# Patient Record
Sex: FEMALE | Race: WHITE | NOT HISPANIC OR LATINO | Employment: OTHER | URBAN - METROPOLITAN AREA
[De-identification: names, ages, dates, MRNs, and addresses within clinical notes are randomized per-mention and may not be internally consistent; named-entity substitution may affect disease eponyms.]

---

## 2013-05-20 LAB — CRC RECOMMENDATION EXT: NORMAL

## 2017-08-30 ENCOUNTER — HOSPITAL ENCOUNTER (OUTPATIENT)
Dept: MEDSURG UNIT | Facility: HOSPITAL | Age: 72
End: 2017-08-31
Attending: INTERNAL MEDICINE | Admitting: INTERNAL MEDICINE

## 2017-08-30 LAB
ABS NEUT (OLG): 4.21 X10(3)/MCL (ref 2.1–9.2)
BASOPHILS # BLD AUTO: 0.1 X10(3)/MCL (ref 0–0.2)
BASOPHILS NFR BLD AUTO: 1 %
BUN SERPL-MCNC: 24 MG/DL (ref 7–18)
CALCIUM SERPL-MCNC: 9.1 MG/DL (ref 8.5–10.1)
CHLORIDE SERPL-SCNC: 109 MMOL/L (ref 98–107)
CO2 SERPL-SCNC: 26 MMOL/L (ref 21–32)
CREAT SERPL-MCNC: 0.9 MG/DL (ref 0.55–1.02)
EOSINOPHIL # BLD AUTO: 0.2 X10(3)/MCL (ref 0–0.9)
EOSINOPHIL NFR BLD AUTO: 3 %
ERYTHROCYTE [DISTWIDTH] IN BLOOD BY AUTOMATED COUNT: 12.6 % (ref 11.5–17)
GLUCOSE SERPL-MCNC: 102 MG/DL (ref 74–106)
HCT VFR BLD AUTO: 37.1 % (ref 37–47)
HGB BLD-MCNC: 12.2 GM/DL (ref 12–16)
INR PPP: 1 (ref 0–1.27)
LYMPHOCYTES # BLD AUTO: 0.8 X10(3)/MCL (ref 0.6–4.6)
LYMPHOCYTES NFR BLD AUTO: 14 %
MCH RBC QN AUTO: 31.8 PG (ref 27–31)
MCHC RBC AUTO-ENTMCNC: 32.9 GM/DL (ref 33–36)
MCV RBC AUTO: 96.6 FL (ref 80–94)
MONOCYTES # BLD AUTO: 0.3 X10(3)/MCL (ref 0.1–1.3)
MONOCYTES NFR BLD AUTO: 5 %
NEUTROPHILS # BLD AUTO: 4.21 X10(3)/MCL (ref 1.4–7.9)
NEUTROPHILS NFR BLD AUTO: 76 %
PLATELET # BLD AUTO: 163 X10(3)/MCL (ref 130–400)
PMV BLD AUTO: 9.4 FL (ref 9.4–12.4)
POTASSIUM SERPL-SCNC: 4.3 MMOL/L (ref 3.5–5.1)
PROTHROMBIN TIME: 13 SECOND(S) (ref 12.1–14.2)
RBC # BLD AUTO: 3.84 X10(6)/MCL (ref 4.2–5.4)
SODIUM SERPL-SCNC: 144 MMOL/L (ref 136–145)
WBC # SPEC AUTO: 5.5 X10(3)/MCL (ref 4.5–11.5)

## 2017-08-31 LAB
ABS NEUT (OLG): 3.09 X10(3)/MCL (ref 2.1–9.2)
ALBUMIN SERPL-MCNC: 3.1 GM/DL (ref 3.4–5)
ALBUMIN/GLOB SERPL: 1.1 RATIO (ref 1.1–2)
ALP SERPL-CCNC: 110 UNIT/L (ref 38–126)
ALT SERPL-CCNC: 18 UNIT/L (ref 12–78)
AST SERPL-CCNC: 26 UNIT/L (ref 15–37)
BASOPHILS # BLD AUTO: 0 X10(3)/MCL (ref 0–0.2)
BASOPHILS NFR BLD AUTO: 1 %
BILIRUB SERPL-MCNC: 0.4 MG/DL (ref 0.2–1)
BILIRUBIN DIRECT+TOT PNL SERPL-MCNC: 0.1 MG/DL (ref 0–0.5)
BILIRUBIN DIRECT+TOT PNL SERPL-MCNC: 0.3 MG/DL (ref 0–0.8)
BUN SERPL-MCNC: 19 MG/DL (ref 7–18)
CALCIUM SERPL-MCNC: 8.7 MG/DL (ref 8.5–10.1)
CHLORIDE SERPL-SCNC: 110 MMOL/L (ref 98–107)
CO2 SERPL-SCNC: 23 MMOL/L (ref 21–32)
CREAT SERPL-MCNC: 0.76 MG/DL (ref 0.55–1.02)
EOSINOPHIL # BLD AUTO: 0.2 X10(3)/MCL (ref 0–0.9)
EOSINOPHIL NFR BLD AUTO: 4 %
ERYTHROCYTE [DISTWIDTH] IN BLOOD BY AUTOMATED COUNT: 12.9 % (ref 11.5–17)
GLOBULIN SER-MCNC: 2.7 GM/DL (ref 2.4–3.5)
GLUCOSE SERPL-MCNC: 91 MG/DL (ref 74–106)
HCT VFR BLD AUTO: 34.9 % (ref 37–47)
HGB BLD-MCNC: 11.1 GM/DL (ref 12–16)
LYMPHOCYTES # BLD AUTO: 1.3 X10(3)/MCL (ref 0.6–4.6)
LYMPHOCYTES NFR BLD AUTO: 26 %
MAGNESIUM SERPL-MCNC: 2.3 MG/DL (ref 1.8–2.4)
MCH RBC QN AUTO: 31.4 PG (ref 27–31)
MCHC RBC AUTO-ENTMCNC: 31.8 GM/DL (ref 33–36)
MCV RBC AUTO: 98.6 FL (ref 80–94)
MONOCYTES # BLD AUTO: 0.4 X10(3)/MCL (ref 0.1–1.3)
MONOCYTES NFR BLD AUTO: 7 %
NEUTROPHILS # BLD AUTO: 3.09 X10(3)/MCL (ref 1.4–7.9)
NEUTROPHILS NFR BLD AUTO: 61 %
PHOSPHATE SERPL-MCNC: 3.8 MG/DL (ref 2.5–4.9)
PLATELET # BLD AUTO: 168 X10(3)/MCL (ref 130–400)
PMV BLD AUTO: 9.7 FL (ref 9.4–12.4)
POTASSIUM SERPL-SCNC: 4.5 MMOL/L (ref 3.5–5.1)
PROT SERPL-MCNC: 5.8 GM/DL (ref 6.4–8.2)
RBC # BLD AUTO: 3.54 X10(6)/MCL (ref 4.2–5.4)
SODIUM SERPL-SCNC: 145 MMOL/L (ref 136–145)
WBC # SPEC AUTO: 5.1 X10(3)/MCL (ref 4.5–11.5)

## 2017-11-13 ENCOUNTER — HOSPITAL ENCOUNTER (OUTPATIENT)
Dept: ADMINISTRATIVE | Facility: HOSPITAL | Age: 72
End: 2017-11-14
Attending: INTERNAL MEDICINE | Admitting: INTERNAL MEDICINE

## 2017-11-13 LAB
ABS NEUT (OLG): 6.8 X10(3)/MCL (ref 2.1–9.2)
ALBUMIN SERPL-MCNC: 3.5 GM/DL (ref 3.4–5)
ALBUMIN/GLOB SERPL: 1.1 RATIO (ref 1.1–2)
ALP SERPL-CCNC: 87 UNIT/L (ref 38–126)
ALT SERPL-CCNC: 20 UNIT/L (ref 12–78)
AST SERPL-CCNC: 27 UNIT/L (ref 15–37)
BASOPHILS # BLD AUTO: 0 X10(3)/MCL (ref 0–0.2)
BASOPHILS NFR BLD AUTO: 0 %
BILIRUB SERPL-MCNC: 0.2 MG/DL (ref 0.2–1)
BILIRUBIN DIRECT+TOT PNL SERPL-MCNC: 0.1 MG/DL (ref 0–0.5)
BILIRUBIN DIRECT+TOT PNL SERPL-MCNC: 0.1 MG/DL (ref 0–0.8)
BUN SERPL-MCNC: 16 MG/DL (ref 7–18)
CALCIUM SERPL-MCNC: 8.3 MG/DL (ref 8.5–10.1)
CHLORIDE SERPL-SCNC: 106 MMOL/L (ref 98–107)
CO2 SERPL-SCNC: 21 MMOL/L (ref 21–32)
CREAT SERPL-MCNC: 0.63 MG/DL (ref 0.55–1.02)
EOSINOPHIL # BLD AUTO: 0 X10(3)/MCL (ref 0–0.9)
EOSINOPHIL NFR BLD AUTO: 0 %
ERYTHROCYTE [DISTWIDTH] IN BLOOD BY AUTOMATED COUNT: 13.2 % (ref 11.5–17)
GLOBULIN SER-MCNC: 3.1 GM/DL (ref 2.4–3.5)
GLUCOSE SERPL-MCNC: 141 MG/DL (ref 74–106)
HCT VFR BLD AUTO: 37.1 % (ref 37–47)
HGB BLD-MCNC: 12.4 GM/DL (ref 12–16)
LYMPHOCYTES # BLD AUTO: 0.9 X10(3)/MCL (ref 0.6–4.6)
LYMPHOCYTES NFR BLD AUTO: 11 %
MCH RBC QN AUTO: 30.5 PG (ref 27–31)
MCHC RBC AUTO-ENTMCNC: 33.4 GM/DL (ref 33–36)
MCV RBC AUTO: 91.4 FL (ref 80–94)
MONOCYTES # BLD AUTO: 0.3 X10(3)/MCL (ref 0.1–1.3)
MONOCYTES NFR BLD AUTO: 4 %
NEUTROPHILS # BLD AUTO: 6.8 X10(3)/MCL (ref 1.4–7.9)
NEUTROPHILS NFR BLD AUTO: 84 %
PLATELET # BLD AUTO: 176 X10(3)/MCL (ref 130–400)
PMV BLD AUTO: 10.1 FL (ref 9.4–12.4)
POTASSIUM SERPL-SCNC: 3.6 MMOL/L (ref 3.5–5.1)
PROT SERPL-MCNC: 6.6 GM/DL (ref 6.4–8.2)
RBC # BLD AUTO: 4.06 X10(6)/MCL (ref 4.2–5.4)
SODIUM SERPL-SCNC: 138 MMOL/L (ref 136–145)
WBC # SPEC AUTO: 8.1 X10(3)/MCL (ref 4.5–11.5)

## 2019-10-14 ENCOUNTER — HOSPITAL ENCOUNTER (OUTPATIENT)
Dept: MEDSURG UNIT | Facility: HOSPITAL | Age: 74
End: 2019-10-15
Attending: INTERNAL MEDICINE | Admitting: INTERNAL MEDICINE

## 2019-10-14 LAB
CK MB SERPL-MCNC: 1.7 NG/ML
CK SERPL-CCNC: 95 UNIT/L (ref 21–232)

## 2019-10-15 LAB
ABS NEUT (OLG): 3.58 X10(3)/MCL (ref 2.1–9.2)
ALBUMIN SERPL-MCNC: 3.5 GM/DL (ref 3.4–5)
ALBUMIN/GLOB SERPL: 1.2 RATIO (ref 1.1–2)
ALP SERPL-CCNC: 72 UNIT/L (ref 46–116)
ALT SERPL-CCNC: 17 UNIT/L (ref 12–78)
AST SERPL-CCNC: 22 UNIT/L (ref 15–37)
BASOPHILS # BLD AUTO: 0 X10(3)/MCL (ref 0–0.2)
BASOPHILS NFR BLD AUTO: 0 %
BILIRUB SERPL-MCNC: 0.5 MG/DL (ref 0.2–1)
BILIRUBIN DIRECT+TOT PNL SERPL-MCNC: 0.09 MG/DL (ref 0–0.2)
BILIRUBIN DIRECT+TOT PNL SERPL-MCNC: 0.41 MG/DL (ref 0–0.8)
BUN SERPL-MCNC: 14.5 MG/DL (ref 7–18)
CALCIUM SERPL-MCNC: 8.5 MG/DL (ref 8.5–10.1)
CHLORIDE SERPL-SCNC: 109 MMOL/L (ref 98–107)
CK MB SERPL-MCNC: 1.2 NG/ML
CO2 SERPL-SCNC: 23.6 MMOL/L (ref 21–32)
CREAT SERPL-MCNC: 0.75 MG/DL (ref 0.6–1.3)
EOSINOPHIL # BLD AUTO: 0.1 X10(3)/MCL (ref 0–0.9)
EOSINOPHIL NFR BLD AUTO: 2 %
ERYTHROCYTE [DISTWIDTH] IN BLOOD BY AUTOMATED COUNT: 12.6 % (ref 11.5–17)
EST. AVERAGE GLUCOSE BLD GHB EST-MCNC: 105 MG/DL
GLOBULIN SER-MCNC: 2.9 GM/DL (ref 2.4–3.5)
GLUCOSE SERPL-MCNC: 93 MG/DL (ref 74–106)
HBA1C MFR BLD: 5.3 % (ref 4.5–6.2)
HCT VFR BLD AUTO: 37.8 % (ref 37–47)
HGB BLD-MCNC: 12.2 GM/DL (ref 12–16)
IMM GRANULOCYTES # BLD AUTO: 0.01 % (ref 0–0.02)
IMM GRANULOCYTES NFR BLD AUTO: 0.2 % (ref 0–0.43)
LYMPHOCYTES # BLD AUTO: 0.9 X10(3)/MCL (ref 0.6–4.6)
LYMPHOCYTES NFR BLD AUTO: 19 %
MCH RBC QN AUTO: 31.7 PG (ref 27–31)
MCHC RBC AUTO-ENTMCNC: 32.3 GM/DL (ref 33–36)
MCV RBC AUTO: 98.2 FL (ref 80–94)
MONOCYTES # BLD AUTO: 0.4 X10(3)/MCL (ref 0.1–1.3)
MONOCYTES NFR BLD AUTO: 8 %
NEUTROPHILS # BLD AUTO: 3.58 X10(3)/MCL (ref 1.4–7.9)
NEUTROPHILS NFR BLD AUTO: 71 %
PLATELET # BLD AUTO: 176 X10(3)/MCL (ref 130–400)
PMV BLD AUTO: 10.5 FL (ref 9.4–12.4)
POC TROPONIN: 0.03 NG/ML (ref 0–0.08)
POC TROPONIN: 0.09 NG/ML (ref 0–0.08)
POTASSIUM SERPL-SCNC: 3.9 MMOL/L (ref 3.5–5.1)
PROT SERPL-MCNC: 6.4 GM/DL (ref 6.4–8.2)
RBC # BLD AUTO: 3.85 X10(6)/MCL (ref 4.2–5.4)
SODIUM SERPL-SCNC: 142 MMOL/L (ref 136–145)
TROPONIN I SERPL-MCNC: <0.02 NG/ML (ref 0.02–0.06)
TSH SERPL-ACNC: 4.68 MIU/ML (ref 0.36–3.74)
WBC # SPEC AUTO: 5.1 X10(3)/MCL (ref 4.5–11.5)

## 2019-10-22 ENCOUNTER — HISTORICAL (OUTPATIENT)
Dept: ADMINISTRATIVE | Facility: HOSPITAL | Age: 74
End: 2019-10-22

## 2019-10-22 LAB
ABS NEUT (OLG): 3.74 X10(3)/MCL (ref 2.1–9.2)
APTT PPP: 30.1 SEC (ref 23.4–34.9)
BUN SERPL-MCNC: 23 MG/DL (ref 7–18)
CALCIUM SERPL-MCNC: 9.3 MG/DL (ref 8.5–10.1)
CHLORIDE SERPL-SCNC: 107 MMOL/L (ref 98–107)
CO2 SERPL-SCNC: 28 MMOL/L (ref 21–32)
CREAT SERPL-MCNC: 0.84 MG/DL (ref 0.55–1.02)
CREAT/UREA NIT SERPL: 27
ERYTHROCYTE [DISTWIDTH] IN BLOOD BY AUTOMATED COUNT: 13 % (ref 11.5–17)
GLUCOSE SERPL-MCNC: 94 MG/DL (ref 74–106)
HCT VFR BLD AUTO: 37.3 % (ref 37–47)
HGB BLD-MCNC: 12.6 GM/DL (ref 12–16)
INR PPP: 1 (ref 2–3)
MCH RBC QN AUTO: 32.5 PG (ref 27–31)
MCHC RBC AUTO-ENTMCNC: 33.8 GM/DL (ref 33–36)
MCV RBC AUTO: 96.1 FL (ref 80–94)
NRBC BLD AUTO-RTO: 0 % (ref 0–0.2)
PLATELET # BLD AUTO: 209 X10(3)/MCL (ref 130–400)
PMV BLD AUTO: 10.5 FL (ref 7.4–10.4)
POTASSIUM SERPL-SCNC: 4.3 MMOL/L (ref 3.5–5.1)
PROTHROMBIN TIME: 12.8 SEC (ref 11.7–14.5)
RBC # BLD AUTO: 3.88 X10(6)/MCL (ref 4.2–5.4)
SODIUM SERPL-SCNC: 138 MMOL/L (ref 136–145)
WBC # SPEC AUTO: 5.3 X10(3)/MCL (ref 4.5–11.5)

## 2019-10-28 ENCOUNTER — HOSPITAL ENCOUNTER (OUTPATIENT)
Dept: ORTHOPEDICS | Facility: HOSPITAL | Age: 74
End: 2019-10-29
Attending: ORTHOPAEDIC SURGERY | Admitting: ORTHOPAEDIC SURGERY

## 2019-10-28 LAB
HCT VFR BLD AUTO: 38.5 % (ref 37–47)
HGB BLD-MCNC: 12.7 GM/DL (ref 12–16)

## 2019-10-29 LAB
ABS NEUT (OLG): 3.76 X10(3)/MCL (ref 2.1–9.2)
BUN SERPL-MCNC: 14 MG/DL (ref 7–18)
CALCIUM SERPL-MCNC: 8.3 MG/DL (ref 8.5–10.1)
CHLORIDE SERPL-SCNC: 109 MMOL/L (ref 98–107)
CO2 SERPL-SCNC: 28 MMOL/L (ref 21–32)
CREAT SERPL-MCNC: 0.83 MG/DL (ref 0.55–1.02)
CREAT/UREA NIT SERPL: 17
ERYTHROCYTE [DISTWIDTH] IN BLOOD BY AUTOMATED COUNT: 13.1 % (ref 11.5–17)
GLUCOSE SERPL-MCNC: 84 MG/DL (ref 74–106)
HCT VFR BLD AUTO: 36.6 % (ref 37–47)
HGB BLD-MCNC: 12.1 GM/DL (ref 12–16)
MCH RBC QN AUTO: 32.2 PG (ref 27–31)
MCHC RBC AUTO-ENTMCNC: 33.1 GM/DL (ref 33–36)
MCV RBC AUTO: 97.3 FL (ref 80–94)
NRBC BLD AUTO-RTO: 0 % (ref 0–0.2)
PLATELET # BLD AUTO: 222 X10(3)/MCL (ref 130–400)
PMV BLD AUTO: 10.1 FL (ref 7.4–10.4)
POTASSIUM SERPL-SCNC: 3.8 MMOL/L (ref 3.5–5.1)
RBC # BLD AUTO: 3.76 X10(6)/MCL (ref 4.2–5.4)
SODIUM SERPL-SCNC: 140 MMOL/L (ref 136–145)
WBC # SPEC AUTO: 5.7 X10(3)/MCL (ref 4.5–11.5)

## 2019-11-12 ENCOUNTER — HISTORICAL (OUTPATIENT)
Dept: ADMINISTRATIVE | Facility: HOSPITAL | Age: 74
End: 2019-11-12

## 2019-12-12 ENCOUNTER — HISTORICAL (OUTPATIENT)
Dept: ADMINISTRATIVE | Facility: HOSPITAL | Age: 74
End: 2019-12-12

## 2020-01-28 ENCOUNTER — HISTORICAL (OUTPATIENT)
Dept: ADMINISTRATIVE | Facility: HOSPITAL | Age: 75
End: 2020-01-28

## 2020-02-05 ENCOUNTER — HISTORICAL (OUTPATIENT)
Dept: RADIOLOGY | Facility: HOSPITAL | Age: 75
End: 2020-02-05

## 2020-02-11 ENCOUNTER — HISTORICAL (OUTPATIENT)
Dept: LAB | Facility: HOSPITAL | Age: 75
End: 2020-02-11

## 2020-02-11 LAB
ABS NEUT (OLG): 2.28 X10(3)/MCL (ref 2.1–9.2)
CRP SERPL HS-MCNC: 0.74 MG/L
ERYTHROCYTE [DISTWIDTH] IN BLOOD BY AUTOMATED COUNT: 12.8 % (ref 11.5–17)
ERYTHROCYTE [SEDIMENTATION RATE] IN BLOOD: 8 MM/HR (ref 0–30)
HCT VFR BLD AUTO: 40.8 % (ref 37–47)
HGB BLD-MCNC: 13.5 GM/DL (ref 12–16)
MCH RBC QN AUTO: 31.2 PG (ref 27–31)
MCHC RBC AUTO-ENTMCNC: 33.1 GM/DL (ref 33–36)
MCV RBC AUTO: 94.2 FL (ref 80–94)
NRBC BLD AUTO-RTO: 0 % (ref 0–0.2)
PLATELET # BLD AUTO: 207 X10(3)/MCL (ref 130–400)
PMV BLD AUTO: 10.1 FL (ref 7.4–10.4)
RBC # BLD AUTO: 4.33 X10(6)/MCL (ref 4.2–5.4)
WBC # SPEC AUTO: 4.8 X10(3)/MCL (ref 4.5–11.5)

## 2020-06-23 ENCOUNTER — HISTORICAL (OUTPATIENT)
Dept: ADMINISTRATIVE | Facility: HOSPITAL | Age: 75
End: 2020-06-23

## 2020-07-07 ENCOUNTER — HISTORICAL (OUTPATIENT)
Dept: ADMINISTRATIVE | Facility: HOSPITAL | Age: 75
End: 2020-07-07

## 2020-08-04 ENCOUNTER — HISTORICAL (OUTPATIENT)
Dept: ADMINISTRATIVE | Facility: HOSPITAL | Age: 75
End: 2020-08-04

## 2020-10-02 ENCOUNTER — HISTORICAL (OUTPATIENT)
Dept: ADMINISTRATIVE | Facility: HOSPITAL | Age: 75
End: 2020-10-02

## 2021-06-22 ENCOUNTER — HISTORICAL (OUTPATIENT)
Dept: ADMINISTRATIVE | Facility: HOSPITAL | Age: 76
End: 2021-06-22

## 2022-04-09 ENCOUNTER — HISTORICAL (OUTPATIENT)
Dept: ADMINISTRATIVE | Facility: HOSPITAL | Age: 77
End: 2022-04-09

## 2022-04-27 VITALS
HEIGHT: 65 IN | DIASTOLIC BLOOD PRESSURE: 82 MMHG | WEIGHT: 130.06 LBS | SYSTOLIC BLOOD PRESSURE: 122 MMHG | BODY MASS INDEX: 21.67 KG/M2

## 2022-04-30 NOTE — CONSULTS
"   Patient:   Tracie Blanc            MRN: [id number]            FIN: [id number]               Age:   72 years     Sex:  Female     :  1945   Associated Diagnoses:   Other anterior dislocation of left hip, initial encounter   Author:   Murphy [place] NP      Consultation Information   Ortho consult      Basic Information   Source of history:  Self, Medical record.    Referral source:  Emergency department.    History limitation:  None.       Chief Complaint   2017 12:18 CDT      slip and fall c/.o left hip pain, positive deformity, pt given fentanyl 200 mcg pta        History of Present Illness   Patient underwent left LIMA 1 month ago per Dr. Miller. She was doing ok, until about 2 weeks ago when she began feeling like "something was loose" in her left hip. She fell in her shower 1 week ago, and then fell again while walking yesterday. She states she felt like her leg was literally slipping out from under her. She was transported to the ED via EMS, where x-rays showed a left hip dislocation. She was placed in knee imobilization and has been on bedrest since admit. She states that she was compliant with posterior hip precautions. Hip was reduced in ED. Denies numbness, tingling distally.       Review of Systems   Constitutional:  Negative.    Eye:  Negative.    Ear/Nose/Mouth/Throat:  Negative.    Respiratory:  Negative.    Cardiovascular:  Negative.    Gastrointestinal:  Negative.    Genitourinary:  Negative.    Hematology/Lymphatics:  Negative.    Endocrine:  Negative.    Immunologic:  Negative.    Musculoskeletal:  Negative except HPI.    Integumentary:  Negative.    Neurologic:  Negative.    Psychiatric:  Negative.    All other systems are negative        Health Status   Allergies:    Allergic Reactions (Selected)  Severity Not Documented  Codeine- No reactions were documented.  Valium- Valium.   Current medications:    Medications (14) Active  Scheduled: (5)  ascorbic acid 500 mg Tab " UD  500 mg 1 tab(s), Oral, Daily  lamotrigine 100 mg Tab  150 mg 1.5 tab(s), Oral, BID  montelukast 10 mg Tab  10 mg 1 tab(s), Oral, qPM  pantoprazole 40 mg EC Tab  40 mg 1 tab(s), Oral, Daily  propranolol 10 mg Tab UD  20 mg 2 tab(s), Oral, BID  Continuous: (1)  sodium chloride 0.9% 1,000 mL  1,000 mL, IV, 75 mL/hr  PRN: (8)  acetaminophen-oxycodone 325 mg-5 mg Tab UD  1 tab(s), Oral, q4hr  albuterol-ipratropium 3mg-0.5 mg/3 mL Sol  3 mL, NEB, q4hr  hydrALAzine 20 mg/ml Inj- 1 mL  10 mg 0.5 mL, IV, q4hr  hydrocodone 5mg/APAP 325 mg  1 tab(s), Oral, q6hr  hydromorphone 2 mg/ml Inj (per mL)  0.5 mg 0.25 mL, IV Push, q4hr  ketorolac 30 mg/mL Sol  15 mg 0.5 mL, IV Push, q6hr  ondansetron 2 mg/mL inj - 2mL  4 mg 2 mL, IV Push, q4hr  promethazine 25 mg/mL Inj  12.5 mg 0.5 mL, IV Slow, q4hr        Histories   Past Medical History:    Active  Wears glasses ([id number])  Left cataract (366.9)  Hypoglycemia ([id number])  Resolved  Restless leg syndrome ([id number]):  Resolved.  seasonal allergies ([id number]):  Resolved.  asthma ([id number]):  Resolved.   Procedure history:    Microlaryngoscopy (None) on 2/17/2014 at 68 Years.  Comments:  2/17/2014 11:08 - Laura Echols RN  auto-populated from documented surgical case  Cataract Extract Phacoemulsification/IOL (Right) on 11/11/2013 at 68 Years.  Comments:  11/11/2013 11:06 - Christiana Durbin RN  auto-populated from documented surgical case  Cataract ([id number]) on 9/30/2013 at 68 Years.  Comments:  11/7/2013 13:36 - Niecy Gaston RN  left  bunionectomy.  Carpal tunnel release.  Comments:  9/19/2013 00:22 - Christiana Ayala RN  2002 and 2010  Back surgery X5.  Umbilical hernia.  right shoulder surgery.  Comments:  9/19/2013 00:19 - Christiana Ayala RN  5 years ago  Spinal fusion ([id number]).  Comments:  9/19/2013 00:21 - Christiana Ayala RN  10/2002, 2004, 2012, and 2013   Social History        Social & Psychosocial Habits    Alcohol  05/16/2013 Risk  Assessment: Denies Alcohol Use    Employment/School  09/19/2013  Highest education: University degree(s)      Comment: wife, mother and grandmother - 09/19/2013 00:24 - Christiana Ayala RN    Home/Environment  09/19/2013  Lives with: Spouse    Nutrition/Health  02/09/2014  Type of diet: Regular    Substance Abuse  05/16/2013 Risk Assessment: Denies Substance Abuse    Tobacco  05/16/2013 Risk Assessment: Denies Tobacco Use      Comment: Denies - 06/23/2016 13:02 - Niurka Bernstein LPN        Physical Examination      Vital Signs (last 24 hrs)_____  Last Charted___________  Temp Oral     36.8 DegC  (AUG 31 03:38)  Heart Rate Peripheral   66 bpm  (AUG 31 03:38)  Resp Rate         18 br/min  (AUG 31 03:38)  SBP      113 mmHg  (AUG 31 03:38)  DBP      69 mmHg  (AUG 31 03:38)  SpO2      97 %  (AUG 31 03:38)     General:  Alert and oriented, No acute distress.    Eye:  Extraocular movements are intact, Normal conjunctiva.    HENT:  Normocephalic, Oral mucosa is moist.    Neck:  Supple, Non-tender.    Respiratory:  Respirations are non-labored, Symmetrical chest wall expansion.    Cardiovascular:  Normal rate, Normal peripheral perfusion.    Gastrointestinal:  Soft, Non-tender, Non-distended.    Genitourinary:  No inguinal tenderness.    Musculoskeletal:  LLE-Nontender to palpation left hip, thigh compartments soft, compressible. GROM hip (log-rolling/circumduction) without significant pain. KI in place. +TA/GS, EHL/FHL intact. SILT distally with 2+ DP pulse..    Integumentary:  Warm, Dry, Pink.    Neurologic:  Alert and oriented.    Cognition and Speech:  Oriented, Speech clear and coherent.       Review / Management   Initial x-ray left hip shows dislocation. Post-reduction films show successful reduction of hip, anatomic alignment, intact hardware.      Impression and Plan   Diagnosis     Other anterior dislocation of left hip, initial encounter ([id number] S73.035A).     Course:  Progressing as expected.    Orders      FWBAT LLE with PT. KI when OOB for better stability. Continue PHP. Fall precautions at home. OK to discharge from ortho standpoint. F/u Dr. Miller within the next week..

## 2022-04-30 NOTE — H&P
Patient:   Tracie Blanc            MRN: [id number]            FIN: [id number]               Age:   72 years     Sex:  Female     :  1945   Associated Diagnoses:   None   Author:   Blank Falcon MD      Basic Information   History limitation:  None.       Chief Complaint   Left hip pain      History of Present Illness             The patient presents with This is a 72-year-old  female comes with a ER with complaints of left hip pain after tripping and falling today.  This is her 2nd fall in the past week.  She fell about a weeks ago while in the shower when her leg give out.  But then she got up and was able to walk around so she did not get this checked at that time  Patient had an elective left hip replacement on  with Dr. yoder and 2 days after her discharge she bumped her commode and is for a stitch.  This was evaluated by Dr. yoder and no intervention was deemed necessary. After her fall today she came to the ER and was diagnosed with left hip dislocation.  Patient underwent left hip manipulation/reduction in ER.  She was given ketamine, propofol in ER.  Prior to her arrival at [facility] she was given fentanyl..  Prior to fall patient denies any chest pain, shortness of breath, headache, nausea vomiting, fever chills, stools black stools, dysuria hematuria or seizures.  She is awake alert and oriented ×3 and in no acute distress.  No focal neuro deficits and follows all commands well.  Strength 5/5 in all extremities except for left lower extremity which is in brace.  She does move her left foot while  Patient admitted to hospitalist service.  Orthopedic consulted.  Patient is a full code.        Review of Systems   Constitutional:  Negative except as documented in history of present illness.    Eye:  Negative except as documented in history of present illness.    Ear/Nose/Mouth/Throat:  Negative except as documented in history of present illness.    Respiratory:   Negative except as documented in history of present illness.    Cardiovascular:  Negative except as documented in history of present illness.    Gastrointestinal:  Negative except as documented in history of present illness.    Genitourinary:  Negative except as documented in history of present illness.    Hematology/Lymphatics:  Negative except as documented in history of present illness.    Endocrine:  Negative except as documented in history of present illness.    Immunologic:  Negative except as documented in history of present illness.    Musculoskeletal:  Negative except as documented in history of present illness.    Integumentary:  Negative except as documented in history of present illness.    Neurologic:  Negative except as documented in history of present illness.    Psychiatric:  Negative except as documented in history of present illness.    All other systems are negative      Health Status   Allergies:    Allergic Reactions (All)  Severity Not Documented  Codeine- No reactions were documented.  Valium- Valium.  Canceled/Inactive Reactions (All)  Severity Not Documented  Morphine- No reactions were documented.,    Allergies (2) Active Reaction  codeine None Documented  Valium Valium     Current medications:  (Selected)   Inpatient Medications  Ordered  Dextrose 5%-1/4 Normal Saline 500 mL: 500 mL, 500 mL, IV, 125 mL/hr, start date 08/30/17 17:53:00 CDT  Pending Complete  midazolam: 2 mg, form: Injection, IV Push, q5min, Order duration: 2 dose(s), first dose 02/17/14 9:16:00 CST, stop date 02/17/14 9:25:00 CST, (up to 5 mg for moderate anxiety)  midazolam: 2 mg, form: Injection, IV Push, q5min, Order duration: 2 dose(s), first dose 05/20/13 7:20:00 CDT, stop date 05/20/13 7:29:00 CDT, (up to 5 mg for moderate anxiety)  Prescriptions  Prescribed  LaMICtal 150 mg oral tablet: 150 mg = 1 tab(s), Oral, BID, # 180 tab(s), 4 Refill(s), Pharmacy: [facility]  Percocet 5/325 oral tablet: 1  tab(s), Oral, q4hr, PRN PRN for pain, X 5 day(s), # 30 tab(s), 0 Refill(s)  Documented Medications  Documented  Calcium, Magnesium and Zinc oral tablet: 1 tab(s), Oral, Daily, # 30 tab(s), 0 Refill(s)  ClonAZEpam 2 mg oral tablet: 2 mg = 1 tab(s), Oral, At Bedtime, 0 Refill(s)  EPI-pen 1 mg/mL injectable solution: PRN allergy symptoms, 0 Refill(s)  MVI with Minerals (Adult Tab): 1 tab(s), Oral, Daily, # 30 tab(s), 0 Refill(s)  Singulair 10 mg oral TABLET: 10 mg = 1 tab(s), Oral, qPM, # 90 tab(s), 0 Refill(s)  Vitamin C 500 mg oral tablet: 500 mg = 1 tab(s), Oral, Daily, # 30 tab(s), 0 Refill(s)  albuterol-ipratropium MDI inhalation aerosol with adapter: INH, As Directed, PRN wheezing, 0 Refill(s)  cyclobenzaprine 10 mg oral tablet: 10 mg = 1 tab(s), Oral, BID, PRN PRN for spasm, # 30 tab(s), 0 Refill(s)  propranolol 20 mg oral tablet: 20 mg = 1 tab(s), Oral, BID, # 180 tab(s), 0 Refill(s),    Medications (1) Active  Scheduled: (0)  Continuous: (1)  D5W1/4NS 500 mL  500 mL, IV, 125 mL/hr  PRN: (0)        Histories   Past Medical History: Asthma, cataracts, seizures (last seizure was ), restless leg syndrome   Family History: Both parents  in their 80s.  Mother had Alzheimer's.  Unknown father's medical history   Procedure history: Micro-laryngoscopy, cataract surgery, left bunionectomy, carpal tunnel release, back surgery ×5, umbilical hernia repair, right shoulder surgery, spinal fusion   Social History     Denies tobacco abuse, alcohol abuse or illicit drug abuse.        Physical Examination      Vital Signs (last 24 hrs)_____  Last Charted___________  Heart Rate Peripheral   73 bpm  (AUG 30 16:27)  Resp Rate         L 10br/min  (AUG 30 15:32)  SBP      H 157mmHg  (AUG 30 16:)  DBP      H 91mmHg  (AUG 30 16:)  SpO2      100 %  (AUG 30 16:)     General:  Alert and oriented, No acute distress.    Eye:  Pupils are equal, round and reactive to light, Extraocular movements are intact, Normal  conjunctiva, Vision unchanged.    HENT:  Normocephalic, Normal hearing, Oral mucosa is moist, No pharyngeal erythema.    Neck:  Supple, Non-tender, No carotid bruit, No jugular venous distention, No lymphadenopathy.    Respiratory:  Lungs are clear to auscultation, Respirations are non-labored, Breath sounds are equal, Symmetrical chest wall expansion, No chest wall tenderness.    Cardiovascular:  Normal rate, Regular rhythm, No murmur, No gallop, Good pulses equal in all extremities, Normal peripheral perfusion.    Gastrointestinal:  Soft, Non-tender, Non-distended, Normal bowel sounds.    Genitourinary:  No costovertebral angle tenderness, No inguinal tenderness.    Lymphatics:  No lymphadenopathy neck, axilla, groin.    Musculoskeletal:  No swelling, No deformity, Left hip mild tenderness.    Integumentary:  Warm, Dry, No rash, Left hip surgical scar healed well with no discharge or bleeding.    Neurologic:  Alert, Oriented, Normal sensory, Normal motor function, No focal deficits, Cranial Nerves II-XII are grossly intact.    Cognition and Speech:  Oriented, Speech clear and coherent, Functional cognition intact.    Psychiatric:  Cooperative, Appropriate mood & affect, Normal judgment, Non-suicidal.       Review / Management   Results review:     Labs (Last four charted values)  Glucose              102 (AUG 30)   PT                   13.0 (AUG 30)   INR                  1.00 (AUG 30) .    Radiology results   Rad Results (ST)   Accession: [id number]  Order: XR Hip Left 1 View  Report Dt/Tm: 08/30/2017 14:56  Report:      XR Hip Left 1 View     REASON FOR EXAM: Post Reduction     COMPARISON:None     FINDINGS:     There is left hip arthroplasty. There is no fracture or dislocation.      Accession: [id number]  Order: XR Hip Left 2 Views  Report Dt/Tm: 08/30/2017 13:57  Report:   Indication: Acute injury     FINDINGS: Frontal and crosstable lateral views of the left hip were  performed. The femoral component of a  left hip arthroplasty is  dislocated superiorly from the hip joint. No fracture deformities are  identified.     IMPRESSION: Superior left hip dislocation.         Documentation reviewed   Condition:  Fair.       Impression and Plan   Diagnosis       Superior left hip dislocation status post fall  -Status post manipulation/reduction in ER on 8/30/2017  -Orthopedic consulted  -PT OT    Left hip pain due to above  -Cover with when necessary pain medications    History of seizures  -Last seizure was in 1998  -Resume home lamotrigine  -Seizure precautions  -Neurochecks  -Stable    Essential hypertension  -Resume home propranolol 20 mg by mouth twice a day  -Cover with hydralazine 10 mg IV every 4 hours when necessary for any systolic blood pressure greater than 160    Chronic back pain  -Cover when necessary pain medications    SCDs  PPI  CODE STATUS = full code    Discharge home with home health whenever cleared by orthopedic.

## 2022-04-30 NOTE — DISCHARGE SUMMARY
Patient:   Tracie Blanc            MRN: [id number]            FIN: [id number]               Age:   72 years     Sex:  Female     :  1945   Associated Diagnoses:   None   Author:   Blank Falcon MD      Physical Examination   General:  Alert and oriented, No acute distress.    Eye:  Pupils are equal, round and reactive to light, Extraocular movements are intact, Normal conjunctiva.    HENT:  Normocephalic, Normal hearing, Oral mucosa is moist.    Neck:  Supple, No carotid bruit, No jugular venous distention, No thyromegaly.    Respiratory:  Lungs are clear to auscultation, Respirations are non-labored, Breath sounds are equal, No chest wall tenderness.    Cardiovascular:  Normal rate, Regular rhythm, No murmur, No gallop, No edema.    Gastrointestinal:  Soft, Non-tender, Non-distended, Normal bowel sounds, No organomegaly.       Vital Signs (last 24 hrs)_____  Last Charted___________  Temp Oral     36.8 DegC  (AUG 31 03:)  Heart Rate Peripheral   66 bpm  (AUG 31 03:)  Resp Rate         18 br/min  (AUG 31 03:)  SBP      113 mmHg  (AUG 31 03:38)  DBP      69 mmHg  (AUG 31 03:)  SpO2      97 %  (AUG 31 03:38)     Lymphatics:  No lymphadenopathy neck, axilla, groin.    Musculoskeletal:  No tenderness, No swelling.    Integumentary:  Warm, Dry, Intact, No pallor, No rash.    Neurologic:  Alert, Oriented, Normal sensory, Normal motor function, No focal deficits, Decreased range of motion left knee due to immobilizer otherwise all other extremities strength equal 5/5.    Psychiatric:  Cooperative, Appropriate mood & affect, Normal judgment, Non-suicidal.    Genitourinary:  No costovertebral angle tenderness, No inguinal tenderness.       Hospital Course   Hospital Course   Admitted from:    This is a 72-year-old  female comes with a ER with complaints of left hip pain after tripping and falling on day of admit.  This was her 2nd fall in the past week.  She fell about a week ago  while in the shower when her leg give out.  But then she got up and was able to walk around so she did not get this checked at that time  Patient had an elective left hip replacement on July 25 of 2017 with Dr. yoder and 2 days after her discharge she bumped her commode and tore a stitch.  This was evaluated by Dr. yoder and no intervention was deemed necessary. After her fall on day of admit she came to the ER and was diagnosed with left hip dislocation.  Patient underwent left hip manipulation/reduction in ER.  She was given ketamine, propofol in ER.  Prior to her arrival at [facility] she was given fentanyl..  Prior to fall patient denies any chest pain, shortness of breath, headache, nausea vomiting, fever chills, stools black stools, dysuria hematuria or seizures.  She is awake alert and oriented ×3 and in no acute distress.  No focal neuro deficits and follows all commands well.  Strength 5/5 in all extremities except for left lower extremity which is in brace.  Recommendations were made to keep the patient overnight for observation due to heavy sedatives given. Patient admitted to hospitalist service.  Orthopedic consulted and they have cleared the patient for discharge.  Patient is now clinically improved and hemodynamically stable for discharge to home and outpatient follow-up    Discharge diagnosis:  Superior left hip dislocation status post fall  Left hip pain due to above  History of seizures  Essential hypertension  Chronic back pain    Discharge home with home health if okay by orthopedic  Diet = cardiac  Activity = per orthopedic and PT OT  Follow-up with PCP and orthopedic ×1 week  Total discharge time = 31 minutes.        Discharge Plan   Discharge Summary Plan   Discharge Status: stable.

## 2022-04-30 NOTE — DISCHARGE SUMMARY
Patient:   Tracie Blanc            MRN: [id number]            FIN: [id number]               Age:   72 years     Sex:  Female     :  1945   Associated Diagnoses:   None   Author:   Maddy Mercer MD      Discharge Information      Discharge Summary Information   ADMIT/DISCHARGE DATE   Admit Date: 2017  Discharge Date: 2017     Physicians   Attending Physician - Santy FOLEY, Sourav WILL  Attending Physician - Maddy Mercer MD  Admitting Physician - Santy FOLEY, Sourav WILL  Consulting Physician - Santy FOLEY, Sourav WILL  Primary Care Physician - Sulema Boss MD     Discharge Diagnosis   Admission diagnosis   left hip dislocation  Intractable nausea and vomiting  Seizure disorder    Discharge diagnoses  Recurrent left hip dislocation  Intractable nausea vomiting resolved  Seizure disorder     Discharge Medications   Prescribed  ondansetron (Zofran 4 mg oral tablet) 4 mg, Oral, q8hr, PRN as needed for nausea/vomiting  Continue  EPINEPHrine (EPI-pen 1 mg/mL injectable solution), PRN allergy symptoms  acetaminophen-HYDROcodone (Norco 7.5 mg-325 mg oral tablet) 1 tab(s), Oral, q4hr, PRN for pain  lamoTRIgine (LaMICtal 150 mg oral tablet) 150 mg, Oral, BID  montelukast (Singulair 10 mg oral TABLET) 10 mg, Oral, qPM  multivitamin with minerals (MVI with Minerals (Adult Tab)) 1 tab(s), Oral, Daily  traZODone (traZODONE 50 mg oral tablet ( Desyrel )) 50 mg, Oral, Once a day (at bedtime)  Discontinue  albuterol-ipratropium (albuterol-ipratropium MDI inhalation aerosol with adapter), INH, As Directed, PRN wheezing  multivitamin with minerals (Calcium, Magnesium and Zinc oral tablet) 1 tab(s), Oral, Daily        Followup   Gutierrez Miller III, MD, within 2 to 4 days   Call for followup appointment        Hospital Course   Hospital Course   Time Spent on Discharge: 40 minutes.       Ms. Blanc is a 72 year old female with a medical history of Seizure Disorder, Hypoglycemia, RLS, and  SeasonalAllergies. She presented to [facility] ER with complaints of left hip pain; leg shortened and internally rotated in triage. She believes she may have dislocated it. She bent over in a swivel chair, the chair turned her hips while sitting, and she felt her hip dislocate. She underwent hip surgery in July with Dr. Miller. She presented to ER in August with hip pain; it was determined that she had discolated her hip at that time. Hip was put back into place by ER physician with the use of IV propofol. Following the procedure, she became very anxious and shakey with several episodes of nausea and vomitting unrelieved with Zofran x 2. She denies other complaints. Denies CP, SOB, palpitations, dizziness, weakness, syncope, near syncope, or falls. Lives at home with ; does not use an assistive device for ambulation.   Initial ER VS: /90, HR 81, Resp 18, oral temp 36.8, and Sp02 99% on RA. CBC, CMP unremarkable. XRs with dislocated hip. She was given propofol and hip was re-aligned by ER MD. She was also given Dilaudid x 2, Zofran x 2, Ativan 2mg, ketoraolac, and IVF while in the ER. SHe was admitted to the hospitalist services for further evaluation and management of intractable nausea/vomitting following propofol administration.      The patient's nausea vomiting improved.  She was tolerating diet before discharge.  She was ambulating with help.  She was continued on home medications.  The patient will follow up with orthopedics this week.         Physical Examination      Vital Signs (last 24 hrs)_____  Last Charted___________  Temp Oral     36.7 DegC  (NOV 14 07:09)  Heart Rate Peripheral   69 bpm  (NOV 14 07:09)  Resp Rate         18 br/min  (NOV 13 23:16)  SBP      H 150mmHg  (NOV 14 07:09)  DBP      80 mmHg  (NOV 14 07:09)  SpO2      98 %  (NOV 14 07:09)     General:  Alert and oriented, No acute distress.    Cognition and Speech:  Oriented, Speech clear and coherent.    HENT:  Normocephalic, Normal  hearing, Oral mucosa is moist.    Neck:  Supple.    Respiratory:  Lungs are clear to auscultation, Respirations are non-labored, Breath sounds are equal.    Cardiovascular:  Normal rate, Regular rhythm, No edema.    Gastrointestinal:  Soft, Non-tender, Non-distended, Normal bowel sounds.    Integumentary:  Warm, Dry, Intact.    Musculoskeletal:  Normal strength, No tenderness, No swelling.    Neurologic:  Alert, Oriented, Normal sensory, No focal deficits.    Psychiatric:  Cooperative           Discharge Plan   Discharge Summary Plan   Discharge disposition: discharge to home into the care of family member.     Prescriptions: reviewed with patient.     Course   Improving.     Education and Follow-up   Counseled: patient.

## 2022-04-30 NOTE — ED PROVIDER NOTES
Patient:   Tracie Blanc            MRN: [id number]            FIN: [id number]               Age:   72 years     Sex:  Female     :  1945   Associated Diagnoses:   Closed dislocation of left hip   Author:   Miguel MOROCHO MD, Wild WILL      Basic Information   Time seen: Date & time 2017 19:48:00.   History source: Patient, spouse.   Arrival mode: Ambulance.   History limitation: None.   Additional information: Patient's physician(s): Paul MOROCHO MD, Gutierrez PAIZ, Chief Complaint from Nursing Triage Note : Chief Complaint   2017 19:48 CST     Chief Complaint           pt c/o left hip pain. pt states she had surgery in july, fell in august and had dislocation. states today twisted while sitting in chair and felt dislocation again. obvious deformity. left leg is shortened and internally rotated. cms intact. pt received  .      History of Present Illness   The patient presents with 71 y/o C female with history of left hip dislocation with hip replacement in 2017 presents to ED via EMS c/o left hip pain onset PTA. Patient states she bent over in a swivel chair, the chair twisted and she felt her hip dislocate..  The onset was just prior to arrival.  The course/duration of symptoms is constant.  Type of injury: twisting.  Location: Left hip. The character of symptoms is pain.  The degree at onset was moderate.  The degree at present is moderate.  The exacerbating factor is none.  The relieving factor is none.  The location where the incident occurred was at home.  Risk factors consist of Hx of left hip dislocation.  Therapy today: emergency medical services.  Associated symptoms: none.        Review of Systems   Constitutional symptoms:  No fever, no chills, no sweats, no weakness.    Skin symptoms:  No rash,    Eye symptoms:  No recent vision problems,    ENMT symptoms:  No ear pain,    Respiratory symptoms:  No shortness of breath, no orthopnea.    Cardiovascular symptoms:  No chest pain, no  palpitations.    Gastrointestinal symptoms:  No abdominal pain, no nausea, no vomiting, no diarrhea.    Genitourinary symptoms:  No dysuria, no hematuria.    Musculoskeletal symptoms:  left hip pain, no back pain, no Muscle pain.    Psychiatric symptoms:  No anxiety, no depression.    Allergy/immunologic symptoms:  No seasonal allergies, no food allergies.              Additional review of systems information: All other systems reviewed and otherwise negative.      Health Status   Allergies:    Allergic Reactions (Selected)  Severity Not Documented  Codeine- No reactions were documented.  Valium- Valium..   Medications:  (Selected)   Inpatient Medications  Pending Complete  midazolam: 2 mg, form: Injection, IV Push, q5min, Order duration: 2 dose(s), first dose 02/17/14 9:16:00 CST, stop date 02/17/14 9:25:00 CST, (up to 5 mg for moderate anxiety)  midazolam: 2 mg, form: Injection, IV Push, q5min, Order duration: 2 dose(s), first dose 05/20/13 7:20:00 CDT, stop date 05/20/13 7:29:00 CDT, (up to 5 mg for moderate anxiety)  Prescriptions  Prescribed  LaMICtal 150 mg oral tablet: 150 mg = 1 tab(s), Oral, BID, # 180 tab(s), 4 Refill(s), Pharmacy: [facility]  Documented Medications  Documented  Calcium, Magnesium and Zinc oral tablet: 1 tab(s), Oral, Daily, # 30 tab(s), 0 Refill(s)  ClonAZEpam 2 mg oral tablet: 2 mg = 1 tab(s), Oral, At Bedtime, 0 Refill(s)  EPI-pen 1 mg/mL injectable solution: PRN allergy symptoms, 0 Refill(s)  MVI with Minerals (Adult Tab): 1 tab(s), Oral, Daily, # 30 tab(s), 0 Refill(s)  Singulair 10 mg oral TABLET: 10 mg = 1 tab(s), Oral, qPM, # 90 tab(s), 0 Refill(s)  Vitamin C 500 mg oral tablet: 500 mg = 1 tab(s), Oral, Daily, # 30 tab(s), 0 Refill(s)  albuterol-ipratropium MDI inhalation aerosol with adapter: INH, As Directed, PRN wheezing, 0 Refill(s)  cyclobenzaprine 10 mg oral tablet: 10 mg = 1 tab(s), Oral, BID, PRN PRN for spasm, # 30 tab(s), 0 Refill(s)  propranolol 20  mg oral tablet: 20 mg = 1 tab(s), Oral, BID, # 180 tab(s), 0 Refill(s).      Past Medical/ Family/ Social History   Medical history:    Active  Wears glasses ([id number])  Left cataract (366.9)  Hypoglycemia (550261741)  Resolved  Restless leg syndrome ([id number]):  Resolved.  seasonal allergies ([id number]):  Resolved.  asthma ([id number]):  Resolved., Reviewed as documented in chart.   Surgical history:    Microlaryngoscopy (None) on 2/17/2014 at 68 Years.  Comments:  2/17/2014 11:08 - Laura Echols RN  auto-populated from documented surgical case  Cataract Extract Phacoemulsification/IOL (Right) on 11/11/2013 at 68 Years.  Comments:  11/11/2013 11:06 - Christiana Durbin RN  auto-populated from documented surgical case  Cataract ([id number]) on 9/30/2013 at 68 Years.  Comments:  11/7/2013 13:36 - Niecy Gaston RN  left  bunionectomy.  Carpal tunnel release.  Comments:  9/19/2013 00:22 - Christiana Ayala RN  2002 and 2010  Back surgery X5.  Umbilical hernia.  right shoulder surgery.  Comments:  9/19/2013 00:19 - Christiana Ayala RN  5 years ago  Spinal fusion ([id number]).  Comments:  9/19/2013 00:21 - Christiana Ayala RN  10/2002, 2004, 2012, and 2013, Reviewed as documented in chart.   Family history: Not significant.   Social history: Alcohol use: Denies, Tobacco use: Denies, Drug use: Denies.   Problem list:    Active Problems (10)  Epilepsy   Grand mal seizure   Hypoglycemia   Ilioinguinal neuralgia   Impaired mobility   Laryngitis   Left cataract   Tremor   Vocal cord cyst   Wears glasses   .      Physical Examination               Vital Signs   Vital Signs   11/13/2017 19:48 CST     Temperature Oral          36.8 DegC                             Peripheral Pulse Rate     81 bpm                             Respiratory Rate          18 br/min                             SpO2                      99 %                             Oxygen Therapy            Room air                              Systolic Blood Pressure   165 mmHg  HI                             Diastolic Blood Pressure  90 mmHg  .   Measurements   11/13/2017 19:48 CST     Weight Dosing             70.5 kg                             Weight Measured and Calculated in Lbs     155.42 lb                             Weight Estimated          70.5 kg                             Height/Length Dosing      165.1 cm                             Height/Length Estimated   165.1 cm                             Body Mass Index Estimated 25.86 kg/m2  .   Basic Oxygen Information   11/13/2017 19:48 CST     SpO2                      99 %                             Oxygen Therapy            Room air  .   General:  Alert, no acute distress.    Skin:  Warm, pink, intact, moist, no rash.    Head:  Normocephalic, atraumatic.    Cardiovascular:  Regular rate and rhythm, No murmur, Normal peripheral perfusion, No edema.    Respiratory:  Lungs are clear to auscultation, respirations are non-labored, breath sounds are equal, Symmetrical chest wall expansion.    Gastrointestinal:  Soft, Nontender, Non distended, Normal bowel sounds.    Musculoskeletal:  Normal strength, Leg position: Left leg, shortened, rotated internally.    Neurological:  Alert and oriented to person, place, time, and situation, No focal neurological deficit observed, CN II-XII intact, normal sensory observed, normal motor observed, normal speech observed.    Lymphatics:  No lymphadenopathy.   Psychiatric:  Cooperative, appropriate mood & affect, normal judgment.       Medical Decision Making   Documents reviewed:  Emergency department nurses' notes, emergency department records.    Orders  Xray    XR Hip Left 2 Views, Miguel MOROCHO MD, Wild WILL, 11/13/17, 19:53, Ordered.   Results review:   Hip x-ray findings  Interpretation by Emergency Physician, Prosthetic hip with posterior dislocation.       Reexamination/ Reevaluation   Time: 11/13/2017 21:36:00 .   Assessment: Hip dislocated  neurovascularly intact patient given Dilaudid patient states she had a significant reaction to analgesics in her prior reduction per her chart she was given ketamine and propofol and what she described was anxiety and an emergency reaction propofol only was given patient with good reduction normal saturation neurologically intact at discharge did inform the physician's assistant who is on call for Dr. yoder.      Procedure   Fracture/ Dislocation Procedure   Confirmed: Patient, procedure, side, and site correct.    Consent: Patient.    Indication: Dislocation.    Location: Left, Hip.    Pre procedure exam: Circulation, motor, and sensory intact.    Procedural sedation: None.    Monitoring: Cardiac, blood pressure, continuous pulse oximetry.    Preparation   Technique: Traction-countertraction method.    Post-procedure exam: Circulation, motor, and sensory intact, X-ray: reduced, Recovered from sedation.    Patient tolerated: Well.    Complications: None.    Performed by: Self.    Procedural sedation   Time: 11/13/2017 21:51:00 .    Confirmed: Patient and procedure correct.    Consent: Patient.    Indication: Closed reduction.    Monitoring: Cardiac, blood pressure, continuous pulse oximetry.    Preparation: IV access.    ASA Class: 2- mild systemic disease.    Physical exam: See physical exam documentation.    Pre sedation vital signs: See nurse's notes.    Procedural sedation: Propofol , IV.    Post sedation vital signs: See nurse's notes.    Procedure Time: See hospital procedure form.    Post sedation condition: Improved.    Patient tolerated: Well.    Complications: None.    Performed by: Self.       Impression and Plan   Diagnosis   Closed dislocation of left hip ([id number] S73.005A)   Plan   Condition: Improved, Stable.    Disposition: Medically cleared, Discharged: Time  11/13/2017 21:53:00, to home.    Prescriptions: Launch prescriptions   Pharmacy:  [facility] 7.5 mg-325 mg oral tablet (Prescribe): 1 tab(s),  Oral, q4hr, PRN PRN for pain, X 5 day(s), # 24 tab(s), 0 Refill(s)  .    Patient was given the following educational materials: Hip Dislocation, Hip Dislocation.    Follow up with: ; Gutierrez Miller III, MD Within 2 to 4 days Call for followup appointment, Primary Care Physician.    Counseled: Patient, Family, Regarding diagnosis, Regarding diagnostic results, Regarding treatment plan, Regarding prescription, Patient indicated understanding of instructions, Family understood.    Notes: I, Erum Easton, acted solely as a scribe for and in the presence of Dr. Rivera who performed the service.  ,       This scribes note accurately reflects the work done by me I have reviewed the note and personally performed a history and physical and agree with all the documentation and findings.       Addendum   Initially patient was without symptoms feeling good enough to go home but patient and began to feel anxious and nauseous she states is like last time she had too much anesthesia patient had a good 15 minute period where she was without symptoms after she had woken up from the propofol unable to control symptoms with 2 doses of Zofran will give Ativan and admit for observation

## 2022-04-30 NOTE — H&P
Patient:   Tracie Blanc            MRN: [id number]            FIN: [id number]               Age:   72 years     Sex:  Female     :  1945   Associated Diagnoses:   None   Author:   Sourav Madera MD      Basic Information   Time Seen:  Date & Time 2017 02:57:00.    Source of history:  Self.    History limitation:  None.    Advance directive:  Full code.    Provider information/ cc:  Primary care:  Karyn FOLEY , Sulema WESLEY       Chief Complaint   Left hip pain; believes she dislocated it.      History of Present Illness   Ms. Blanc is a 72 year old female with a medical history of Seizure Disorder, Hypoglycemia, RLS, and SeasonalAllergies. She presented to [facility] ER with complaints of left hip pain; leg shortened and internally rotated in triage. She believes she may have dislocated it. She bent over in a swivel chair, the chair turned her hips while sitting, and she felt her hip dislocate. She underwent hip surgery in July with Dr. Miller. She presented to ER in August with hip pain; it was determined that she had discolated her hip at that time. Hip was put back into place by ER physician with the use of IV propofol. Following the procedure, she became very anxious and shakey with several episodes of nausea and vomitting unrelieved with Zofran x 2. She denies other complaints. Denies CP, SOB, palpitations, dizziness, weakness, syncope, near syncope, or falls. Lives at home with ; does not use an assistive device for ambulation.   Initial ER VS: /90, HR 81, Resp 18, oral temp 36.8, and Sp02 99% on RA. CBC, CMP unremarkable. XRs with dislocated hip. She was given propofol and hip was re-aligned by ER MD. She was also given Dilaudid x 2, Zofran x 2, Ativan 2mg, ketoraolac, and IVF while in the ER. He was admitted to the hospitalist services for further evaluation and management of intractable nausea/vomitting following propofol administration.       Review of Systems    Except as documented, all other systems reviewed and negative.       Health Status   Allergies:    Allergic Reactions (Selected)  Severity Not Documented  Codeine- No reactions were documented.  Ketamine- Hallucinations.  Valium- Valium.,    Allergies (3) Active Reaction  codeine None Documented  ketamine HALLUCINATIONS  Valium Valium     Current medications:  (Selected)   Inpatient Medications  Ordered  Phenergan: 12.5 mg, form: Injection, IV Push, q4hr PRN for nausea/vomiting, first dose 11/14/17 0:54:00 CST, 26,051  Protonix: 40 mg, form: Tab-EC, Oral, Daily, first dose 11/14/17 6:00:00 CST, 66,022  Sodium Chloride 0.9% 1,000 mL: 1,000 mL, 1,000 mL, IV, 125 mL/hr, start date 11/14/17 0:54:00 CST  Zofran: 8 mg, form: Injection, IV Slow, q4hr PRN for nausea, first dose 11/14/17 0:54:00 CST, 26,051  acetaminophen: 1,000 mg, form: Tab, Oral, q6hr PRN for pain, mild, first dose 11/14/17 0:54:00 CST, 30,022  Pending Complete  midazolam: 2 mg, form: Injection, IV Push, q5min, Order duration: 2 dose(s), first dose 02/17/14 9:16:00 CST, stop date 02/17/14 9:25:00 CST, (up to 5 mg for moderate anxiety)  midazolam: 2 mg, form: Injection, IV Push, q5min, Order duration: 2 dose(s), first dose 05/20/13 7:20:00 CDT, stop date 05/20/13 7:29:00 CDT, (up to 5 mg for moderate anxiety)  Prescriptions  Prescribed  LaMICtal 150 mg oral tablet: 150 mg = 1 tab(s), Oral, BID, # 180 tab(s), 4 Refill(s), Pharmacy: [facility]  [place] 7.5 mg-325 mg oral tablet: 1 tab(s), Oral, q4hr, PRN PRN for pain, X 5 day(s), # 24 tab(s), 0 Refill(s)  Documented Medications  Documented  Calcium, Magnesium and Zinc oral tablet: 1 tab(s), Oral, Daily, # 30 tab(s), 0 Refill(s)  EPI-pen 1 mg/mL injectable solution: PRN allergy symptoms, 0 Refill(s)  MVI with Minerals (Adult Tab): 1 tab(s), Oral, Daily, # 30 tab(s), 0 Refill(s)  Singulair 10 mg oral TABLET: 10 mg = 1 tab(s), Oral, qPM, # 90 tab(s), 0 Refill(s)  albuterol-ipratropium MDI  inhalation aerosol with adapter: INH, As Directed, PRN wheezing, 0 Refill(s)  traZODONE 50 mg oral tablet ( Desyrel ): 50 mg = 1 tab(s), Oral, Once a day (at bedtime), states she takes 1/4 of a pill as needed,    Medications (5) Active  Scheduled: (1)  pantoprazole 40 mg EC Tab  40 mg 1 tab(s), Oral, Daily  Continuous: (1)  sodium chloride 0.9% 1,000 mL  1,000 mL, IV, 125 mL/hr  PRN: (3)  acetaminophen 500 mg Tab  1,000 mg 2 tab(s), Oral, q6hr  ondansetron 2 mg/mL inj - 2mL  8 mg 4 mL, IV Slow, q4hr  promethazine 25 mg/mL Inj  12.5 mg 0.5 mL, IV Push, q4hr        Histories     Past Medical History: Seizure Disorder, Hypoglycemia, RLS, SeasonalAllergies   Past Surgical History: Bunionectomy, Carpal Tunnel Release, Back Surgery x 5, Rigjt shoulder surgery,   Family History: Denies   Social History:  No alcohol, tobacco or illicit drug use.       Physical Examination      Vital Signs (last 24 hrs)_____  Last Charted___________  Temp Oral     36.5 DegC  (NOV 14 00:43)  Heart Rate Peripheral   83 bpm  (NOV 14 00:43)  Resp Rate         18 br/min  (NOV 13 23:16)  SBP      H 152mmHg  (NOV 14 00:43)  DBP      H 96mmHg  (NOV 14 00:43)  SpO2      98 %  (NOV 14 00:43)     General:  Alert and oriented, No acute distress.    Cognition and Speech:  Oriented, Speech clear and coherent.    HENT:  Normocephalic, Normal hearing, Oral mucosa is moist.    Neck:  Supple.    Respiratory:  Lungs are clear to auscultation, Respirations are non-labored, Breath sounds are equal.    Cardiovascular:  Normal rate, Regular rhythm, No edema.    Gastrointestinal:  Soft, Non-tender, Non-distended, Normal bowel sounds.    Integumentary:  Warm, Dry, Intact.    Musculoskeletal:  Normal strength, No tenderness, No swelling.    Neurologic:  Alert, Oriented, Normal sensory, No focal deficits.    Psychiatric:  Cooperative, Anxious. .       Review / Management   Laboratory Results   Today's Lab Results : PowerNote Discrete Results   11/13/2017 23:28 CST      WBC                       8.1 x10(3)/mcL                             Hgb                       12.4 gm/dL                             Hct                       37.1 %                             Platelet                  176 x10(3)/mcL                             Sodium Lvl                138 mmol/L                             Potassium Lvl             3.6 mmol/L                             Chloride                  106 mmol/L                             CO2                       21.0 mmol/L                             Calcium Lvl               8.3 mg/dL  LOW                             Glucose Lvl               141 mg/dL  HI                             BUN                       16.0 mg/dL                             Creatinine                0.63 mg/dL                             eGFR-AA                   >60 mL/min/1.73 m2  NA                             eGFR-UVALDO                  >60 mL/min/1.73 m2  NA                             Bili Total                0.2 mg/dL                             Bili Direct               0.10 mg/dL                             Bili Indirect             0.10 mg/dL                             AST                       27 unit/L                             ALT                       20 unit/L                             Alk Phos                  87 unit/L                             Total Protein             6.6 gm/dL                             Albumin Lvl               3.50 gm/dL                             Globulin                  3.10 gm/dL                             A/G Ratio                 1.1 ratio           Impression and Plan   Intractable Nausea and Vomitting - resolved   - PRN analgesics  - Regular diet   - NS @ 125ml/hr     Right Hip Dislocation - resolved  - Realigned by ER MD   - Follow-up appointment with Dr. Miller, Ortho, as scheduled     Seizure Disorder   - Resume medications    She is stable and ready for discharge in the am following PO diet, if she tolerates it.      Code status: Full Code   DVT prophylaxis: SCDs bilaterally   Admission time 72 minutes.     I, HENNY Haile-C, reviewed and discussed the case with Dr. Sourav Madera.       Professional Services   I, Sourav Madera MD, assumed care of this patient at the time of this addendum and assisted with the composition of the above assesment and plan. For this patient encounter, I reviewed the NP or PA documentation, treatment plan, and medical decision making; and I had face-to-face time with this patient.  Labs and imaging were reviewed and I agree with history, physical and medical decision making as detailed above.      Pleasant 72-year-old female with recurrent right hip dislocations.  She presented to the ER and had realigned by the ER physician but received propofol for this.  When she woke up from the procedure she was shaky and anxious and nauseated.  Hospitalist service was asked to admit her for nausea that apparently was intractable.  Her nausea has since resolved.  If she is able to tolerate breakfast she may be discharged home with follow-up with Dr. yoder orthopedic surgeon.  Currently she is able to walk but requires assistance and states the last time she had her leg realigned she had to use a walker temporarily.  Was able to stand her up at bedside a walker to the bathroom but her gait was weak.  Asked her to get her  to bring her walker for her in the morning, and we'll prepare for discharge.  Discharge orders are placed if she is able to tolerate breakfast she may go home.

## 2022-04-30 NOTE — ED PROVIDER NOTES
Patient:   Tracie Blanc            MRN: [id number]            FIN: [id number]               Age:   72 years     Sex:  Female     :  1945   Associated Diagnoses:   Dislocation of left hip   Author:   Jose Luis FOLEY, William MILNER      Basic Information   Time seen: Date & time 2017 12:21:00.   History source: Patient, spouse.   Arrival mode: Ambulance.   History limitation: None.   Additional information: Patient's physician(s): Paul MOROCHO MD, Gutierrez PAIZ, Chief Complaint from Nursing Triage Note : Chief Complaint   2017 12:18 CDT      Chief Complaint           slip and fall c/.o left hip pain, positive deformity, pt given fentanyl 200 mcg pta  .      History of Present Illness   The patient presents following fall and 73 y/o CF presents to the ED via EMS c/o left hip pain afer a slip and fall just PTA. She states something felt wrong with her left leg even prior to falling. Pt was given 200mcg of Fentanyl en route.  reports pt had a hip replacement 3.5 weeks ago with Dr. Miller. She fell about 2 weeks ago while in the shower, but they saw Dr. Miller afterwards and were told her hip was fine..  The onset was just prior to arrival.  The occurrence was single episode.  The fall was described as slipped.  The location where the incident occurred was at home.  Location: Left hip. The character of symptoms is pain.  The degree at present is moderate.  The exacerbating factor is none.  The relieving factor is none.  Risk factors consist of age.  Therapy today: emergency medical services.  Preceding symptoms none.  Associated symptoms: none.  Additional history: none.        Review of Systems   Constitutional symptoms:  Negative except as documented in HPI.   Skin symptoms:  Negative except as documented in HPI.   Eye symptoms:  Negative except as documented in HPI.   ENMT symptoms:  Negative except as documented in HPI.   Respiratory symptoms:  Negative except as documented in HPI.    Cardiovascular symptoms:  Negative except as documented in HPI.   Gastrointestinal symptoms:  Negative except as documented in HPI.   Genitourinary symptoms:  Negative except as documented in HPI.   Musculoskeletal symptoms:  Reports: Left, hip, pain.   Neurologic symptoms:  Negative except as documented in HPI.   Psychiatric symptoms:  Negative except as documented in HPI.   Endocrine symptoms:  Negative except as documented in HPI.   Hematologic/Lymphatic symptoms:  Negative except as documented in HPI.   Allergy/immunologic symptoms:  Negative except as documented in HPI.             Additional review of systems information: All other systems reviewed and otherwise negative.      Health Status   Allergies:    Allergic Reactions (Selected)  Severity Not Documented  Codeine- No reactions were documented.  Valium- Valium..   Medications:  (Selected)   Inpatient Medications  Pending Complete  midazolam: 2 mg, form: Injection, IV Push, q5min, Order duration: 2 dose(s), first dose 02/17/14 9:16:00 CST, stop date 02/17/14 9:25:00 CST, (up to 5 mg for moderate anxiety)  midazolam: 2 mg, form: Injection, IV Push, q5min, Order duration: 2 dose(s), first dose 05/20/13 7:20:00 CDT, stop date 05/20/13 7:29:00 CDT, (up to 5 mg for moderate anxiety)  Prescriptions  Prescribed  LaMICtal 150 mg oral tablet: 150 mg = 1 tab(s), Oral, BID, # 180 tab(s), 4 Refill(s), [facility]  Documented Medications  Documented  Calcium, Magnesium and Zinc oral tablet: 1 tab(s), Oral, Daily, # 30 tab(s), 0 Refill(s)  ClonAZEpam 2 mg oral tablet: 2 mg = 1 tab(s), Oral, At Bedtime, 0 Refill(s)  EPI-pen 1 mg/mL injectable solution: PRN allergy symptoms, 0 Refill(s)  MVI with Minerals (Adult Tab): 1 tab(s), Oral, Daily, # 30 tab(s), 0 Refill(s)  Singulair 10 mg oral TABLET: 10 mg = 1 tab(s), Oral, qPM, # 90 tab(s), 0 Refill(s)  Vitamin C 500 mg oral tablet: 500 mg = 1 tab(s), Oral, Daily, # 30 tab(s), 0  Refill(s)  albuterol-ipratropium MDI inhalation aerosol with adapter: INH, As Directed, PRN wheezing, 0 Refill(s)  cyclobenzaprine 10 mg oral tablet: 10 mg = 1 tab(s), Oral, BID, PRN PRN for spasm, # 30 tab(s), 0 Refill(s)  propranolol 20 mg oral tablet: 20 mg = 1 tab(s), Oral, BID, # 180 tab(s), 0 Refill(s).      Past Medical/ Family/ Social History   Medical history:    Active  Wears glasses (201461509)  Left cataract (366.9)  Hypoglycemia (174703434)  Resolved  Restless leg syndrome ([id number]):  Resolved.  seasonal allergies ([id number]):  Resolved.  asthma ([id number]):  Resolved..   Surgical history:    Microlaryngoscopy (None) on 2/17/2014 at 68 Years.  Comments:  2/17/2014 11:08 - Laura Echols RN  auto-populated from documented surgical case  Cataract Extract Phacoemulsification/IOL (Right) on 11/11/2013 at 68 Years.  Comments:  11/11/2013 11:06 - Christiana Durbin RN  auto-populated from documented surgical case  Cataract ([id number]) on 9/30/2013 at 68 Years.  Comments:  11/7/2013 13:36 - Niecy Gaston RN  left  bunionectomy.  Carpal tunnel release.  Comments:  9/19/2013 00:22 - Christiana Ayala RN  2002 and 2010  Back surgery X5.  Umbilical hernia.  right shoulder surgery.  Comments:  9/19/2013 00:19 - Christiana Ayala RN  5 years ago  Spinal fusion ([id number]).  Comments:  9/19/2013 00:21 - Christiana Ayala RN  10/2002, 2004, 2012, and 2013.   Family history: Not significant.   Social history: Alcohol use: Denies, Tobacco use: Denies, Drug use: Denies.      Physical Examination               Vital Signs   Vital Signs   8/30/2017 12:18 CDT      Temperature Temporal Artery               36.9 DegC                             Peripheral Pulse Rate     91 bpm                             Respiratory Rate          18 br/min                             SpO2                      100 %                             Systolic Blood Pressure   164 mmHg  HI                              Diastolic Blood Pressure  84 mmHg  .   Basic Oxygen Information   8/30/2017 12:18 CDT      SpO2                      100 %  .   General:  Alert, mild distress.    Skin:  Warm, dry, pink.    Eye:  Pupils are equal, round and reactive to light, extraocular movements are intact, normal conjunctiva.    Ears, nose, mouth and throat:  Tympanic membranes clear, oral mucosa moist, no pharyngeal erythema or exudate.    Neck:  Supple, trachea midline, no JVD, no carotid bruit.    Cardiovascular:  Regular rate and rhythm, No murmur, Normal peripheral perfusion, No edema.    Respiratory:  Lungs are clear to auscultation, respirations are non-labored, breath sounds are equal.    Gastrointestinal:  Soft, Nontender, Non distended, Normal bowel sounds.    Musculoskeletal:  Bruising to left hip. Obvious deformity to left hip. Left hip is shortened and internally rotated.   Neurological:  Alert and oriented to person, place, time, and situation, No focal neurological deficit observed, CN II-XII intact, normal sensory observed, normal motor observed, normal speech observed.    Psychiatric:  Cooperative, appropriate mood & affect, normal judgment.       Medical Decision Making   Documents reviewed:  Emergency department nurses' notes.   Orders  Laboratory    CBC w/ Auto Diff, William Amaya MD, 08/30/17, 12:25, Ordered    BMP, William Amaya MD, 08/30/17, 12:25, Ordered    INR - Protime, William Amaya MD, 08/30/17, 12:25, Ordered  Xray    XR Hip Left 2 Views, William Amaya MD, 08/30/17, 12:25, Ordered.   Results review:  Lab results : Lab View   8/30/2017 12:47 CDT      Sodium Lvl                144 mmol/L                             Potassium Lvl             4.3 mmol/L                             Chloride                  109 mmol/L  HI                             CO2                       26.0 mmol/L                             Calcium Lvl               9.1 mg/dL                             Glucose Lvl                102 mg/dL                             BUN                       24.0 mg/dL  HI                             Creatinine                0.90 mg/dL                             eGFR-AA                   >60 mL/min/1.73 m2  NA                             eGFR-UVALDO                  >60 mL/min/1.73 m2  NA                             PT                        13.0 second(s)                             INR                       1.00                             WBC                       5.5 x10(3)/mcL                             RBC                       3.84 x10(6)/mcL  LOW                             Hgb                       12.2 gm/dL                             Hct                       37.1 %                             Platelet                  163 x10(3)/mcL                             MCV                       96.6 fL  HI                             MCH                       31.8 pg  HI                             MCHC                      32.9 gm/dL  LOW                             RDW                       12.6 %                             MPV                       9.4 fL                             Abs Neut                  4.21 x10(3)/mcL                             Neutro Auto               76 %  NA                             Lymph Auto                14 %  NA                             Mono Auto                 5 %  NA                             Eos Auto                  3 %  NA                             Abs Eos                   0.2 x10(3)/mcL                             Basophil Auto             1 %  NA                             Abs Neutro                4.21 x10(3)/mcL                             Abs Lymph                 0.8 x10(3)/mcL                             Abs Mono                  0.3 x10(3)/mcL                             Abs Baso                  0.1 x10(3)/mcL  .      Reexamination/ Reevaluation   Time: 8/30/2017 16:23:00 .   Notes: Patient still recovering from procedural sedation.  She  still feels nauseous and is not ready to attempt to ambulate..      Procedure   Procedural sedation   Time: 8/30/2017 14:37:00 .    Confirmed: Patient and procedure correct, Time-out taken prior to procedure.    Consent: Patient, Has signed consent.    Indication: Closed reduction (left hip).    Monitoring: Cardiac, blood pressure, continuous pulse oximetry.    Preparation: Suction, IV access, Constant attendance.    ASA Class: I- healthy patient.    Physical exam: Airway: appears normal, Heart: regular rate and rhythm, Breath sounds: equal, Neuro: normal.    Pre sedation vital signs: See nurse's notes.    Procedural sedation: Ketamine 100mg; Propofol 25mg.    Post sedation vital signs: See nurse's notes.    Procedure Time: See hospital procedure form.    Post sedation condition: Improved, Stable.    Patient tolerated: Well.    Complications: None, Lowest O2 sat  100 %.    Performed by: Self, Nurse practitioner (Kev).    Fracture/ Dislocation Procedure   Time: 8/30/2017 14:38:00 .    Confirmed: Patient, procedure, side, and site correct.    Consent: Patient.    Indication: Dislocation.    Location: Left, Hip.    Pre procedure exam: Circulation, motor, and sensory intact.    Procedural sedation: Ketamin 100mg, Propofol .    Monitoring: Cardiac, blood pressure, continuous pulse oximetry.    Preparation   Technique: Traction-countertraction method (+manipulation).    Post-procedure exam: Circulation, motor, and sensory intact, Alignment improved.    Patient tolerated: Well.    Complications: None.    Performed by: Self, Nurse practitioner Greta).    Total time: 30 minutes.       Impression and Plan   Diagnosis   Dislocation of left hip (OAH64-LQ S73.005A)   Plan   Condition: Improved, Stable.    Disposition: Admit time  8/30/2017 18:06:00, Place in Observation Unit, Blank Falcon MD    Prescriptions: Launch prescriptions   Pharmacy:  Percocet 5/325 oral tablet (Prescribe): 1 tab(s), Oral, q4hr, PRN PRN for pain,  X 5 day(s), # 30 tab(s), 0 Refill(s).    Patient was given the following educational materials: Hip Dislocation.    Follow up with: Gutierrez Miller III, MD Within 5 to 7 days Call for followup appointment tomorrow; Report to ED if symptoms return / worsen Within 1 to 2 days, only if needed.    Counseled: Patient, Family, Regarding diagnosis, Regarding diagnostic results, Regarding treatment plan, Patient understood. Family understood.    Orders: Launch Orders   Pharmacy:  Zofran 2 mg/mL injectable solution (Order): 4 mg, form: Injection, IV Push, Once, first dose 8/30/2017 16:25 CDT, stop date 8/30/2017 16:25 CDT, 24  Admit/Transfer/Discharge:  Discharge (Order): Home.    Notes: Blanco BARRERA, acted solely as a scribe for and in the presence of Dr. Amaya, who performed this service., I, Dr. Amaya, personally performed the services described in this documentation. This note accurately reflects work and decisions made by me..

## 2022-11-09 ENCOUNTER — OFFICE VISIT (OUTPATIENT)
Dept: NEUROLOGY | Facility: CLINIC | Age: 77
End: 2022-11-09
Payer: MEDICARE

## 2022-11-09 VITALS
WEIGHT: 134.63 LBS | SYSTOLIC BLOOD PRESSURE: 138 MMHG | HEIGHT: 65 IN | BODY MASS INDEX: 22.43 KG/M2 | DIASTOLIC BLOOD PRESSURE: 72 MMHG

## 2022-11-09 DIAGNOSIS — G40.109 LOCALIZATION-RELATED EPILEPSY: Primary | ICD-10-CM

## 2022-11-09 PROCEDURE — 99213 OFFICE O/P EST LOW 20 MIN: CPT | Mod: PBBFAC | Performed by: PSYCHIATRY & NEUROLOGY

## 2022-11-09 PROCEDURE — 99999 PR PBB SHADOW E&M-EST. PATIENT-LVL III: ICD-10-PCS | Mod: PBBFAC,,, | Performed by: PSYCHIATRY & NEUROLOGY

## 2022-11-09 PROCEDURE — 99999 PR PBB SHADOW E&M-EST. PATIENT-LVL III: CPT | Mod: PBBFAC,,, | Performed by: PSYCHIATRY & NEUROLOGY

## 2022-11-09 PROCEDURE — 99213 PR OFFICE/OUTPT VISIT, EST, LEVL III, 20-29 MIN: ICD-10-PCS | Mod: S$PBB,,, | Performed by: PSYCHIATRY & NEUROLOGY

## 2022-11-09 PROCEDURE — 99213 OFFICE O/P EST LOW 20 MIN: CPT | Mod: S$PBB,,, | Performed by: PSYCHIATRY & NEUROLOGY

## 2022-11-09 RX ORDER — TRAZODONE HYDROCHLORIDE 100 MG/1
1 TABLET ORAL
COMMUNITY
Start: 2022-04-05

## 2022-11-09 RX ORDER — MONTELUKAST SODIUM 10 MG/1
10 TABLET ORAL DAILY
COMMUNITY
Start: 2022-08-23

## 2022-11-09 RX ORDER — LAMOTRIGINE 150 MG/1
150 TABLET ORAL 2 TIMES DAILY
Qty: 180 TABLET | Refills: 4 | Status: SHIPPED | OUTPATIENT
Start: 2022-11-09 | End: 2024-01-02 | Stop reason: SDUPTHER

## 2022-11-09 RX ORDER — LAMOTRIGINE 150 MG/1
150 TABLET ORAL 2 TIMES DAILY
COMMUNITY
Start: 2022-08-26 | End: 2022-11-09 | Stop reason: SDUPTHER

## 2022-11-09 RX ORDER — ACETAMINOPHEN, DIPHENHYDRAMINE HCL, PHENYLEPHRINE HCL 325; 25; 5 MG/1; MG/1; MG/1
10 TABLET ORAL NIGHTLY PRN
COMMUNITY

## 2022-12-29 ENCOUNTER — DOCUMENTATION ONLY (OUTPATIENT)
Dept: ADMINISTRATIVE | Facility: HOSPITAL | Age: 77
End: 2022-12-29
Payer: MEDICARE

## 2023-06-15 ENCOUNTER — OFFICE VISIT (OUTPATIENT)
Dept: ORTHOPEDICS | Facility: CLINIC | Age: 78
End: 2023-06-15
Payer: MEDICARE

## 2023-06-15 ENCOUNTER — HOSPITAL ENCOUNTER (OUTPATIENT)
Dept: RADIOLOGY | Facility: CLINIC | Age: 78
Discharge: HOME | End: 2023-06-15
Attending: ORTHOPAEDIC SURGERY
Payer: MEDICARE

## 2023-06-15 VITALS
BODY MASS INDEX: 23.7 KG/M2 | WEIGHT: 138.81 LBS | SYSTOLIC BLOOD PRESSURE: 140 MMHG | HEIGHT: 64 IN | HEART RATE: 74 BPM | DIASTOLIC BLOOD PRESSURE: 80 MMHG

## 2023-06-15 DIAGNOSIS — M25.552 BILATERAL HIP PAIN: Primary | ICD-10-CM

## 2023-06-15 DIAGNOSIS — Z96.652 S/P TKR (TOTAL KNEE REPLACEMENT), LEFT: ICD-10-CM

## 2023-06-15 DIAGNOSIS — M25.552 BILATERAL HIP PAIN: ICD-10-CM

## 2023-06-15 DIAGNOSIS — M25.551 BILATERAL HIP PAIN: ICD-10-CM

## 2023-06-15 DIAGNOSIS — M25.551 BILATERAL HIP PAIN: Primary | ICD-10-CM

## 2023-06-15 PROCEDURE — 99213 PR OFFICE/OUTPT VISIT, EST, LEVL III, 20-29 MIN: ICD-10-PCS | Mod: ,,, | Performed by: ORTHOPAEDIC SURGERY

## 2023-06-15 PROCEDURE — 99213 OFFICE O/P EST LOW 20 MIN: CPT | Mod: ,,, | Performed by: ORTHOPAEDIC SURGERY

## 2023-06-15 PROCEDURE — 73562 X-RAY EXAM OF KNEE 3: CPT | Mod: LT,,, | Performed by: ORTHOPAEDIC SURGERY

## 2023-06-15 PROCEDURE — 73523 X-RAY EXAM HIPS BI 5/> VIEWS: CPT | Mod: ,,, | Performed by: ORTHOPAEDIC SURGERY

## 2023-06-15 PROCEDURE — 73523 XR HIP 5 OR MORE VIEWS BILATERAL: ICD-10-PCS | Mod: ,,, | Performed by: ORTHOPAEDIC SURGERY

## 2023-06-15 PROCEDURE — 73562 XR KNEE 3 VIEW LEFT: ICD-10-PCS | Mod: LT,,, | Performed by: ORTHOPAEDIC SURGERY

## 2023-06-15 NOTE — PROGRESS NOTES
Chief Complaint:   Chief Complaint   Patient presents with    Left Hip - Pain    Right Hip - Pain    Left Knee - Follow-up    Hip Pain     Pt states she injured her hips due over-exercising about 6 months ago. Pt describes her pain as ache/weakening and feels like something is loose inside.    Knee Pain     Pt has hx of Lt TKA in 2020. Pt states that everything is going well with her knee.       History of present illness:  78-year-old female presents for follow-up after revision of her left hip for instability.  Doing fairly well.  She just finished a 5 mi run and had some pain afterwards.  It is worse in the lateral hip.  Also complaining of some left knee pain.  Right hip bothers on occasion but it is very mild.    Past Medical History:   Diagnosis Date    Diverticulosis     Seizure        Past Surgical History:   Procedure Laterality Date    COLONOSCOPY  05/20/2013       Current Outpatient Medications   Medication Sig    lamoTRIgine (LAMICTAL) 150 MG Tab Take 1 tablet (150 mg total) by mouth 2 (two) times daily.    melatonin 10 mg Tab Take 10 mg by mouth nightly as needed.    montelukast (SINGULAIR) 10 mg tablet Take 10 mg by mouth once daily.    traZODone (DESYREL) 100 MG tablet Take 1 tablet by mouth every evening.     No current facility-administered medications for this visit.       Review of patient's allergies indicates:   Allergen Reactions    Codeine Other (See Comments)     Delusional, felt like she was going to die       Diazepam Other (See Comments)     Delusions, felt like she was going to die; agitation, dizziness      Dexlansoprazole Nausea And Vomiting     N/V/D, confusion    Ketamine Hallucinations    Oxycodone-acetaminophen Nausea And Vomiting     Dizziness         Family History   Problem Relation Age of Onset    Alzheimer's disease Mother     Dementia Father     Asthma Brother        Social History     Socioeconomic History    Marital status:    Tobacco Use    Smoking status: Never     Smokeless tobacco: Never   Substance and Sexual Activity    Alcohol use: Not Currently    Drug use: Never    Sexual activity: Yes     Partners: Male           Review of Systems:    Constitution: Negative for chills, fever, and sweats.  Negative for unexplained weight loss.    HENT:  Negative for headaches and blurry vision.    Cardiovascular:Negative for chest pain or irregular heart beat. Negative for hypertension.    Respiratory:  Negative for cough and shortness of breath.    Gastrointestinal: Negative for abdominal pain, heartburn, melena, nausea, and vomitting.    Genitourinary:  Negative bladder incontinence and dysuria.    Musculoskeletal:  See HPI    Neurological: Negative for numbness.    Psychiatric/Behavioral: Negative for depression.  The patient is not nervous/anxious.      Endocrine: Negative for polyuria    Hematologic/Lymphatic: Negative for bleeding problem.  Does not bruise/bleed easily.    Skin: Negative for poor would healing and rash      Physical Examination:    Vital Signs:    Vitals:    06/15/23 1426   BP: (!) 140/80   Pulse: 74       Body mass index is 23.82 kg/m².    General: No acute distress, alert and oriented, healthy appearing    HEENT: Head is atraumatic, mucous membranes are moist    Neck: Supples, no JVD    Cardiovascular: Palpable dorsalis pedis and posterior tibial pulses, regular rate and rhythm to those pulses    Lungs: Breathing non-labored    Skin: no rashes appreciated    Neurologic: Can flex and extend knees, ankles, and toes. Sensation is grossly intact    Left hip:  Range of motion left hip without significant discomfort.  Patient has tenderness laterally over trochanteric bursa.  Patient with excellent range of motion of her left knee as well as right knee.  Some very mild patellofemoral crepitus on the left side    X-rays:  Three views of the bilateral hips taken and reviewed.  Patient's left hip shows intact prosthesis.  No signs of loosening subsidence or polyethylene  wear noted.  Four views of bilateral knees taken and reviewed.  Patient with mild medial joint space narrowing of the left knee.     Assessment::  Status post left total knee arthroplasty revision  Right hip also osteoarthritis  Bilateral knee osteoarthritis    Plan:  Discussed all treatment options the patient.  Patient's implants were well fixed.  No signs of wear other issues.  With regards to her knees, she is having early arthritic pain although she is not wish any intervention at this time    This note was created using M Modal voice recognition software that occasionally misinterpreted phrases or words.    Consult note is delivered via Epic messaging service.

## 2023-11-02 ENCOUNTER — TELEPHONE (OUTPATIENT)
Dept: NEUROLOGY | Facility: CLINIC | Age: 78
End: 2023-11-02
Payer: MEDICARE

## 2023-11-02 NOTE — TELEPHONE ENCOUNTER
10/1/2023 LAMOTRIGINE 150MG TAB [facility] #[id number] at [address] [phone] 180.00 tablet  90 SMARTSI Tablet(s) By Mouth Twice Daily     Rcv'd 180 tabs on 10/01/2023  Will have enough medication to last until visit 2024

## 2023-11-02 NOTE — TELEPHONE ENCOUNTER
Pt reports she is now taking this medication 150 mg daily.   States she changed this dose 6 months ago.   States she is doing good.     Medication: Lamictal 150 mg    Pharmacy:   [facility] #[id number] at [address] [address]  Phone: [phone] Fax: [phone]       Last Appointment: 11/9/23    Next Appointment: 1/2/2024     Call back number: [phone]

## 2024-01-02 ENCOUNTER — OFFICE VISIT (OUTPATIENT)
Dept: NEUROLOGY | Facility: CLINIC | Age: 79
End: 2024-01-02
Payer: MEDICARE

## 2024-01-02 VITALS
SYSTOLIC BLOOD PRESSURE: 120 MMHG | HEIGHT: 64 IN | WEIGHT: 136 LBS | DIASTOLIC BLOOD PRESSURE: 70 MMHG | BODY MASS INDEX: 23.22 KG/M2

## 2024-01-02 DIAGNOSIS — G40.109 LOCALIZATION-RELATED EPILEPSY: Primary | ICD-10-CM

## 2024-01-02 PROCEDURE — 99212 OFFICE O/P EST SF 10 MIN: CPT | Mod: S$PBB,,, | Performed by: PSYCHIATRY & NEUROLOGY

## 2024-01-02 PROCEDURE — 99999 PR PBB SHADOW E&M-EST. PATIENT-LVL III: CPT | Mod: PBBFAC,,, | Performed by: PSYCHIATRY & NEUROLOGY

## 2024-01-02 PROCEDURE — 99213 OFFICE O/P EST LOW 20 MIN: CPT | Mod: PBBFAC | Performed by: PSYCHIATRY & NEUROLOGY

## 2024-01-02 RX ORDER — LAMOTRIGINE 150 MG/1
150 TABLET ORAL DAILY
Qty: 90 TABLET | Refills: 4 | Status: SHIPPED | OUTPATIENT
Start: 2024-01-02

## 2024-01-02 NOTE — PROGRESS NOTES
Subjective     Patient ID: Tracie Blanc is a 78 y.o. female.    Chief Complaint: Seizures    HPI  No sz since 1998  Cut backt o lamictal 150 mg /day once a day  Feels better  No auras  No new medical issues  Review of Systems  The remainder of the 14 system ROS is noncontributory or negative unless mentioned/reviewed above.       Objective     Physical Exam  Mental Status: Alert and oriented x3. Language is fluent with good comprehension.    Cranial Nerve: Pupils are equal, round, and reactive to light. Visual fields are intact to confrontation. Normal fundi. Ocular movements are intact. Face is symmetric at rest and with activation with intact sensation throughout. Hearing intact to finger rub bilaterally. Muscles of tongue and palate activate symmetrically. No dysarthria. Strength is full in sternocleidomastoid and trapezius bilaterally.    Motor: Muscle bulk and tone are normal. Strength is 5/5 in all four extremities both proximally and distally. Intact fine motor movements bilaterally. There is no pronator drift or satelliting on arm roll.    Sensory: Sensation is intact to light touch, pinprick, vibration, and proprioception throughout. Romberg is negative.    Reflexes: 2+ and symmetric at the biceps, triceps, brachioradialis, patella, and Achilles bilaterally. Plantar response is flexor bilaterally.    Coordination: No dysmetria on finger-nose-finger or heel-knee-shin. Normal rapid alternating movements. Fast finger tapping with normal amplitude and speed.    Gait: Narrow based with normal stride length and good arm swing bilaterally. Able to walk on heels, toes, and in tandem.       Assessment and Plan     1. Localization-related epilepsy    Other orders  -     lamoTRIgine (LAMICTAL) 150 MG Tab; Take 1 tablet (150 mg total) by mouth once daily.  Dispense: 90 tablet; Refill: 4        Lamictal 150 mg/day         Follow up in about 1 year (around 1/2/2025).

## 2024-09-02 ENCOUNTER — HOSPITAL ENCOUNTER (EMERGENCY)
Facility: HOSPITAL | Age: 79
Discharge: HOME | End: 2024-09-02
Attending: EMERGENCY MEDICINE
Payer: MEDICARE

## 2024-09-02 VITALS
RESPIRATION RATE: 15 BRPM | WEIGHT: 136 LBS | HEIGHT: 64 IN | OXYGEN SATURATION: 100 % | TEMPERATURE: 98 F | SYSTOLIC BLOOD PRESSURE: 155 MMHG | BODY MASS INDEX: 23.22 KG/M2 | DIASTOLIC BLOOD PRESSURE: 81 MMHG | HEART RATE: 59 BPM

## 2024-09-02 DIAGNOSIS — R07.9 CHEST PAIN: Primary | ICD-10-CM

## 2024-09-02 LAB
ALBUMIN SERPL-MCNC: 4 G/DL (ref 3.4–4.8)
ALBUMIN/GLOB SERPL: 1.3 RATIO (ref 1.1–2)
ALP SERPL-CCNC: 74 UNIT/L (ref 40–150)
ALT SERPL-CCNC: 16 UNIT/L (ref 0–55)
ANION GAP SERPL CALC-SCNC: 8 MEQ/L
AST SERPL-CCNC: 23 UNIT/L (ref 5–34)
BASOPHILS # BLD AUTO: 0.05 X10(3)/MCL
BASOPHILS NFR BLD AUTO: 1.1 %
BILIRUB SERPL-MCNC: 0.4 MG/DL
BUN SERPL-MCNC: 16.1 MG/DL (ref 9.8–20.1)
CALCIUM SERPL-MCNC: 9.7 MG/DL (ref 8.4–10.2)
CHLORIDE SERPL-SCNC: 110 MMOL/L (ref 98–107)
CO2 SERPL-SCNC: 24 MMOL/L (ref 23–31)
CREAT SERPL-MCNC: 0.87 MG/DL (ref 0.55–1.02)
CREAT/UREA NIT SERPL: 19
EOSINOPHIL # BLD AUTO: 0.17 X10(3)/MCL (ref 0–0.9)
EOSINOPHIL NFR BLD AUTO: 3.9 %
ERYTHROCYTE [DISTWIDTH] IN BLOOD BY AUTOMATED COUNT: 12.9 % (ref 11.5–17)
GFR SERPLBLD CREATININE-BSD FMLA CKD-EPI: >60 ML/MIN/1.73/M2
GLOBULIN SER-MCNC: 3.2 GM/DL (ref 2.4–3.5)
GLUCOSE SERPL-MCNC: 101 MG/DL (ref 82–115)
HCT VFR BLD AUTO: 41.6 % (ref 37–47)
HGB BLD-MCNC: 13.6 G/DL (ref 12–16)
IMM GRANULOCYTES # BLD AUTO: 0.01 X10(3)/MCL (ref 0–0.04)
IMM GRANULOCYTES NFR BLD AUTO: 0.2 %
LYMPHOCYTES # BLD AUTO: 1.34 X10(3)/MCL (ref 0.6–4.6)
LYMPHOCYTES NFR BLD AUTO: 30.5 %
MCH RBC QN AUTO: 31.8 PG (ref 27–31)
MCHC RBC AUTO-ENTMCNC: 32.7 G/DL (ref 33–36)
MCV RBC AUTO: 97.2 FL (ref 80–94)
MONOCYTES # BLD AUTO: 0.35 X10(3)/MCL (ref 0.1–1.3)
MONOCYTES NFR BLD AUTO: 8 %
NEUTROPHILS # BLD AUTO: 2.48 X10(3)/MCL (ref 2.1–9.2)
NEUTROPHILS NFR BLD AUTO: 56.3 %
PLATELET # BLD AUTO: 185 X10(3)/MCL (ref 130–400)
PMV BLD AUTO: 10.2 FL (ref 7.4–10.4)
POTASSIUM SERPL-SCNC: 4.1 MMOL/L (ref 3.5–5.1)
PROT SERPL-MCNC: 7.2 GM/DL (ref 5.8–7.6)
RBC # BLD AUTO: 4.28 X10(6)/MCL (ref 4.2–5.4)
SODIUM SERPL-SCNC: 142 MMOL/L (ref 136–145)
TROPONIN I SERPL-MCNC: <0.01 NG/ML (ref 0–0.04)
TROPONIN I SERPL-MCNC: <0.01 NG/ML (ref 0–0.04)
WBC # BLD AUTO: 4.4 X10(3)/MCL (ref 4.5–11.5)

## 2024-09-02 PROCEDURE — 84484 ASSAY OF TROPONIN QUANT: CPT | Performed by: EMERGENCY MEDICINE

## 2024-09-02 PROCEDURE — 85025 COMPLETE CBC W/AUTO DIFF WBC: CPT | Performed by: EMERGENCY MEDICINE

## 2024-09-02 PROCEDURE — 93005 ELECTROCARDIOGRAM TRACING: CPT

## 2024-09-02 PROCEDURE — 80053 COMPREHEN METABOLIC PANEL: CPT | Performed by: EMERGENCY MEDICINE

## 2024-09-02 PROCEDURE — 99285 EMERGENCY DEPT VISIT HI MDM: CPT | Mod: 25

## 2024-09-02 PROCEDURE — 25000003 PHARM REV CODE 250: Performed by: EMERGENCY MEDICINE

## 2024-09-02 PROCEDURE — 93010 ELECTROCARDIOGRAM REPORT: CPT | Mod: ,,, | Performed by: INTERNAL MEDICINE

## 2024-09-02 RX ORDER — ASPIRIN 325 MG
325 TABLET ORAL
Status: COMPLETED | OUTPATIENT
Start: 2024-09-02 | End: 2024-09-02

## 2024-09-02 RX ADMIN — ASPIRIN 325 MG ORAL TABLET 325 MG: 325 PILL ORAL at 10:09

## 2024-09-02 NOTE — ED PROVIDER NOTES
NAME:  Tracie Blanc  CSN:     [id number]  MRN:    [id number]  ADMIT DATE: 9/2/2024        eMERGENCY dEPARTMENT eNCOUnter    CHIEF COMPLAINT    Chief Complaint   Patient presents with    Chest Pain     Pt c/o mid sternal chest tightness that radiates to her right shoulder; states she began to feel light headed when it started. Pt states it feels like indigestion        HPI      Tracie Blanc is a 79 y.o. female who presents to the ED for evaluation of chest pain.  Patient complains of midsternal chest pain that radiates to the right shoulder.  She states it started approximately 7:00 a.m. this morning.  It was not associated with any shortness of breath, nausea vomiting or diaphoresis.  She has no cardiac history.          ALLERGIES    Review of patient's allergies indicates:   Allergen Reactions    Codeine Other (See Comments)     Delusional, felt like she was going to die       Diazepam Other (See Comments)     Delusions, felt like she was going to die; agitation, dizziness      Dexlansoprazole Nausea And Vomiting     N/V/D, confusion    Ketamine Hallucinations    Oxycodone-acetaminophen Nausea And Vomiting     Dizziness         PAST MEDICAL HISTORY  Past Medical History:   Diagnosis Date    Diverticulosis     Seizure        SURGICAL HISTORY    Past Surgical History:   Procedure Laterality Date    COLONOSCOPY  05/20/2013       SOCIAL HISTORY    Social History     Socioeconomic History    Marital status:    Tobacco Use    Smoking status: Never    Smokeless tobacco: Never   Substance and Sexual Activity    Alcohol use: Not Currently    Drug use: Never    Sexual activity: Yes     Partners: Male       FAMILY HISTORY    Family History   Problem Relation Name Age of Onset    Alzheimer's disease Mother      Dementia Father      Asthma Brother         REVIEW OF SYSTEMS   ROS  All Systems otherwise negative except as noted in the History of Present Illness.        PHYSICAL EXAM    Reviewed Triage  "Note  VITAL SIGNS:   ED Triage Vitals [09/02/24 1043]   Enc Vitals Group      BP (!) 175/83      Pulse 71      Resp 17      Temp 97.7 °F (36.5 °C)      Temp Source Oral      SpO2 97 %      Weight 136 lb      Height 5' 4"      Head Circumference       Peak Flow       Pain Score       Pain Loc       Pain Education       Exclude from Growth Chart        Patient Vitals for the past 24 hrs:   BP Temp Temp src Pulse Resp SpO2 Height Weight   09/02/24 1304 (!) 155/81 -- -- (!) 54 15 99 % -- --   09/02/24 1206 (!) 172/83 -- -- 63 12 100 % -- --   09/02/24 1143 -- -- -- 60 16 99 % -- --   09/02/24 1113 -- -- -- 64 14 97 % -- --   09/02/24 1043 (!) 175/83 97.7 °F (36.5 °C) Oral 71 17 97 % 5' 4" (1.626 m) 61.7 kg (136 lb)           Physical Exam    Constitutional:  Well-developed, well-nourished. No acute distress  HENT:  Normocephalic, atraumatic.  Eyes:  EOMI. Conjunctiva normal without discharge.   Neck: Normal range of motion.No stridor. No meningismus.   Respiratory:  Normal breath sounds bilaterally.  No respiratory distress, retractions, or conversational dyspnea. No wheezing. No rhonchi. No rales.   Cardiovascular:  Normal heart rate. Normal rhythm. No pitting lower extremity edema.   GI:  Abdomen soft, non-distended, non-tender. Normal bowel sounds. No guarding, rigidity or rebound.    : No CVA tenderness.   Musculoskeletal:  No gross deformity or limited range of motion of all major joints. No palpable bony deformity. No tenderness to palpation.  Integument:  Warm and dry. No rash.  Neurologic:  Normal motor function. Normal sensory function. No focal deficits noted. Alert and Interactive.  Psychiatric:  Affect normal. Mood normal.         LABS  Pertinent labs reviewed. (See chart for details)   Labs Reviewed   COMPREHENSIVE METABOLIC PANEL - Abnormal       Result Value    Sodium 142      Potassium 4.1      Chloride 110 (*)     CO2 24      Glucose 101      Blood Urea Nitrogen 16.1      Creatinine 0.87      Calcium " 9.7      Protein Total 7.2      Albumin 4.0      Globulin 3.2      Albumin/Globulin Ratio 1.3      Bilirubin Total 0.4      ALP 74      ALT 16      AST 23      eGFR >60      Anion Gap 8.0      BUN/Creatinine Ratio 19     CBC WITH DIFFERENTIAL - Abnormal    WBC 4.40 (*)     RBC 4.28      Hgb 13.6      Hct 41.6      MCV 97.2 (*)     MCH 31.8 (*)     MCHC 32.7 (*)     RDW 12.9      Platelet 185      MPV 10.2      Neut % 56.3      Lymph % 30.5      Mono % 8.0      Eos % 3.9      Basophil % 1.1      Lymph # 1.34      Neut # 2.48      Mono # 0.35      Eos # 0.17      Baso # 0.05      IG# 0.01      IG% 0.2     TROPONIN I - Normal    Troponin-I <0.010     TROPONIN I - Normal    Troponin-I <0.010     CBC W/ AUTO DIFFERENTIAL    Narrative:     The following orders were created for panel order CBC auto differential.  Procedure                               Abnormality         Status                     ---------                               -----------         ------                     CBC with Differential[[id number]]       Abnormal            Final result                 Please view results for these tests on the individual orders.         RADIOLOGY    Imaging Results              X-Ray Chest AP Portable (Final result)  Result time 09/02/24 11:28:21      Final result by Alexandro Oshea MD (09/02/24 11:28:21)                   Impression:      No acute cardiopulmonary process.      Electronically signed by: Alexandro Oshea MD  Date:    09/02/2024  Time:    11:28               Narrative:    EXAMINATION:  Chest one view    CLINICAL HISTORY:  Chest pain    COMPARISON:  06/02/2020    FINDINGS:  Cardiac silhouette is normal size.  Central vessels are within normal limits.  No confluent airspace disease.  No visible pneumothorax or pleural effusion.  No acute osseous abnormality                                      PROCEDURES    Procedures      EKG     Interpreted by ERP:     EKG Readings: (Independently Interpreted)   Rhythm:  Normal Sinus Rhythm. Heart Rate: 67. Ectopy: No Ectopy. Conduction: Normal. ST Segments: Normal ST Segments. T Waves: Normal. Clinical Impression: Normal Sinus Rhythm       ED COURSE & MEDICAL DECISION MAKING    Pertinent & Imaging studies reviewed. (See chart for details and specific orders.)        Medications   aspirin tablet 325 mg (325 mg Oral Given 9/2/24 1059)          2 sets of troponin are negative.  Patient reports her pain is improved.  She was advised to follow-up with her PCP to discuss her symptoms and return to the ED as needed       DISPOSITION  Patient discharged in stable condition at No discharge date for patient encounter.      DISCHARGE INSTRUCTIONS & MEDS       Medication List        ASK your doctor about these medications      lamoTRIgine 150 MG Tab  Commonly known as: LAMICTAL  Take 1 tablet (150 mg total) by mouth once daily.     melatonin 10 mg Tab     montelukast 10 mg tablet  Commonly known as: SINGULAIR     traZODone 100 MG tablet  Commonly known as: DESYREL                New Prescriptions    No medications on file           FINAL IMPRESSION    1. Chest pain              Blood Pressure Follow-Up Advised  Patient advised to follow up with PCP within 3-5 days for blood pressure re-check if blood pressure is equal to or greater than 120/80.         Critical care time spent with this patient (not including separately billable items) was  0 minutes.     DISCLAIMER: This note was prepared with Dragon NaturallySpeaking voice recognition transcription software. Garbled syntax, mangled pronouns, and other bizarre constructions may be attributed to that software system.      Narciso Garcia MD  09/02/2024  3:03 PM           Narciso Garcia MD  09/02/24 2572

## 2024-09-03 LAB
OHS QRS DURATION: 142 MS
OHS QTC CALCULATION: 494 MS

## 2024-10-10 ENCOUNTER — OFFICE VISIT (OUTPATIENT)
Dept: ORTHOPEDICS | Facility: CLINIC | Age: 79
End: 2024-10-10
Payer: MEDICARE

## 2024-10-10 ENCOUNTER — HOSPITAL ENCOUNTER (OUTPATIENT)
Dept: RADIOLOGY | Facility: CLINIC | Age: 79
Discharge: HOME | End: 2024-10-10
Attending: NURSE PRACTITIONER
Payer: MEDICARE

## 2024-10-10 VITALS
BODY MASS INDEX: 23.39 KG/M2 | HEIGHT: 64 IN | HEART RATE: 73 BPM | WEIGHT: 137 LBS | DIASTOLIC BLOOD PRESSURE: 78 MMHG | SYSTOLIC BLOOD PRESSURE: 128 MMHG

## 2024-10-10 DIAGNOSIS — M25.552 LEFT HIP PAIN: ICD-10-CM

## 2024-10-10 DIAGNOSIS — M25.552 LEFT HIP PAIN: Primary | ICD-10-CM

## 2024-10-10 PROCEDURE — 73502 X-RAY EXAM HIP UNI 2-3 VIEWS: CPT | Mod: LT,,, | Performed by: NURSE PRACTITIONER

## 2024-10-10 PROCEDURE — 99213 OFFICE O/P EST LOW 20 MIN: CPT | Mod: ,,, | Performed by: NURSE PRACTITIONER

## 2024-10-10 NOTE — PROGRESS NOTES
Chief Complaint:   Chief Complaint   Patient presents with    Left Hip - Pain     Pt states she has been experiencing pain in the Lt hip after a fall a month ago. Pt states she feels the implant out of place. Pain is located in the buttock area and radiates into the inner part of the thigh. Pt report weakness and the sensation the leg is turning outwards.        History of present illness: Tracie Blanc is a 79 y.o. female, presents to clinic today in regards to her left hip.  Patient does have a history of revision hip done several years ago by Dr. Valdovinos.  Has done well with this revision although she does state she is having some weakness and soreness to this area.  States that she is very active in the exam and spent about 2 hours there.  She was in a cardio class and then does rolling for 2500 m and the [employer]air[employer] shortly after.  States that she has been doing this religiously until the last 2 or 3 weeks.  She had a virus in which kept her out of the gym.  Also patient states she sustained a fall weeks ago.  At this time she is just experiencing weakness.  Also concerned about her implant.    Past Medical History:   Diagnosis Date    Diverticulosis     Seizure        Past Surgical History:   Procedure Laterality Date    COLONOSCOPY  05/20/2013       Current Outpatient Medications   Medication Sig    lamoTRIgine (LAMICTAL) 150 MG Tab Take 1 tablet (150 mg total) by mouth once daily.    melatonin 10 mg Tab Take 10 mg by mouth nightly as needed.    montelukast (SINGULAIR) 10 mg tablet Take 10 mg by mouth once daily.    traZODone (DESYREL) 100 MG tablet Take 1 tablet by mouth as needed.     No current facility-administered medications for this visit.       Review of patient's allergies indicates:   Allergen Reactions    Codeine Other (See Comments)     Delusional, felt like she was going to die       Diazepam Other (See Comments)     Delusions, felt like she was going to die; agitation, dizziness       Dexlansoprazole Nausea And Vomiting     N/V/D, confusion    Ketamine Hallucinations    Oxycodone-acetaminophen Nausea And Vomiting     Dizziness         Family History   Problem Relation Name Age of Onset    Alzheimer's disease Mother      Dementia Father      Asthma Brother         Social History     Socioeconomic History    Marital status:    Tobacco Use    Smoking status: Never    Smokeless tobacco: Never   Substance and Sexual Activity    Alcohol use: Not Currently    Drug use: Never    Sexual activity: Yes     Partners: Male           Review of Systems:    Denies fevers, chills, chest pain, shortness of breath. Comprehensive review of systems performed and otherwise negative except as noted in HPI     Physical Examination:    General: awake and alert, no acute distress, healthy appearing  Head and Neck: Head atraumatic/normocephalic. Moist MM  CV: brisk cap refill  Lungs: non-labored breathing, w/o cough or SOB  Skin: no rashes present, warm to touch  Neuro: sensation grossly intact distally       Vital Signs:    Vitals:    10/10/24 0915   BP: 128/78   Pulse: 73       Body mass index is 23.52 kg/m².    Left hip exam:    Left hip incision clean dry and intact. No erythema, drainage, or signs of infection  No swelling distally. No signs of DVT  Brisk cap refill right foot  Sensation intact distally to right foot  Positive FHL/EHL/gastrocsoleus/tib ant        X-rays: 3 views of the left hip reviewed.  Hardware in good position with no loosening or subsidence noted.  No wear and tear noted of the plastic liner.     Assessment::s/p revision L LIMA    Plan:  Patient presents to clinic today in which x-rays are compatible to her once a year ago.  No change noting any type of loosening or subsidence.  Patient was educated to continue her exercising but did note the on increase her exercise after being out of the gym for awhile she will be sore.  She will continue her exercises for strengthening in regards to this  weakness.  States that she does have an instructor which helps her at the gym.  Overall her hip is stable.  We will have him follow up on an as-needed basis.  Patient states understanding and agrees with plan of care.    This note was created using DreamNotes voice recognition software that occasionally misinterpreted phrases or words.    Consult note is delivered via Epic messaging service.

## 2024-11-08 ENCOUNTER — HOSPITAL ENCOUNTER (EMERGENCY)
Facility: HOSPITAL | Age: 79
Discharge: HOME | End: 2024-11-08
Attending: EMERGENCY MEDICINE
Payer: MEDICARE

## 2024-11-08 VITALS
DIASTOLIC BLOOD PRESSURE: 80 MMHG | WEIGHT: 137 LBS | HEART RATE: 61 BPM | SYSTOLIC BLOOD PRESSURE: 158 MMHG | OXYGEN SATURATION: 100 % | RESPIRATION RATE: 18 BRPM | TEMPERATURE: 98 F | BODY MASS INDEX: 23.39 KG/M2 | HEIGHT: 64 IN

## 2024-11-08 DIAGNOSIS — M54.6 ACUTE LEFT-SIDED THORACIC BACK PAIN: Primary | ICD-10-CM

## 2024-11-08 PROBLEM — M96.1 POSTLAMINECTOMY SYNDROME OF LUMBAR REGION: Status: ACTIVE | Noted: 2024-11-08

## 2024-11-08 PROBLEM — R25.1 TREMOR: Status: ACTIVE | Noted: 2024-11-08

## 2024-11-08 PROBLEM — E55.9 VITAMIN D DEFICIENCY: Status: ACTIVE | Noted: 2018-08-13

## 2024-11-08 PROBLEM — G57.90 ILIOINGUINAL NEURALGIA: Status: ACTIVE | Noted: 2024-11-08

## 2024-11-08 PROBLEM — G47.33 OSA ON CPAP: Status: ACTIVE | Noted: 2023-02-14

## 2024-11-08 PROBLEM — E03.8 SUBCLINICAL HYPOTHYROIDISM: Status: ACTIVE | Noted: 2021-10-07

## 2024-11-08 PROCEDURE — 96375 TX/PRO/DX INJ NEW DRUG ADDON: CPT

## 2024-11-08 PROCEDURE — 96374 THER/PROPH/DIAG INJ IV PUSH: CPT

## 2024-11-08 PROCEDURE — 63600175 PHARM REV CODE 636 W HCPCS: Performed by: EMERGENCY MEDICINE

## 2024-11-08 PROCEDURE — 99284 EMERGENCY DEPT VISIT MOD MDM: CPT | Mod: 25

## 2024-11-08 RX ORDER — HYDROMORPHONE HYDROCHLORIDE 2 MG/ML
0.5 INJECTION, SOLUTION INTRAMUSCULAR; INTRAVENOUS; SUBCUTANEOUS
Status: COMPLETED | OUTPATIENT
Start: 2024-11-08 | End: 2024-11-08

## 2024-11-08 RX ORDER — TIZANIDINE 4 MG/1
4 TABLET ORAL EVERY 8 HOURS
Qty: 30 TABLET | Refills: 0 | Status: SHIPPED | OUTPATIENT
Start: 2024-11-08 | End: 2024-11-18

## 2024-11-08 RX ORDER — HYDROCODONE BITARTRATE AND ACETAMINOPHEN 5; 325 MG/1; MG/1
1 TABLET ORAL EVERY 6 HOURS PRN
Qty: 12 TABLET | Refills: 0 | Status: SHIPPED | OUTPATIENT
Start: 2024-11-08

## 2024-11-08 RX ORDER — METHOCARBAMOL 100 MG/ML
1000 INJECTION, SOLUTION INTRAMUSCULAR; INTRAVENOUS
Status: COMPLETED | OUTPATIENT
Start: 2024-11-08 | End: 2024-11-08

## 2024-11-08 RX ORDER — METHYLPREDNISOLONE SOD SUCC 125 MG
125 VIAL (EA) INJECTION
Status: COMPLETED | OUTPATIENT
Start: 2024-11-08 | End: 2024-11-08

## 2024-11-08 RX ADMIN — HYDROMORPHONE HYDROCHLORIDE 0.5 MG: 2 INJECTION INTRAMUSCULAR; INTRAVENOUS; SUBCUTANEOUS at 09:11

## 2024-11-08 RX ADMIN — METHYLPREDNISOLONE SODIUM SUCCINATE 125 MG: 125 INJECTION, POWDER, FOR SOLUTION INTRAMUSCULAR; INTRAVENOUS at 09:11

## 2024-11-08 RX ADMIN — METHOCARBAMOL 1000 MG: 100 INJECTION INTRAMUSCULAR; INTRAVENOUS at 09:11

## 2024-11-08 NOTE — ED PROVIDER NOTES
Encounter Date: 11/8/2024       History     Chief Complaint   Patient presents with    Shoulder Pain     Pt c/o right shoulder pain she woke up with yesterday; denies injury, states she thinks she just slept wrong. Pt reports pain worsens when she tries to take a deep breath.      This 79-year-old female presents with complaints of left shoulder pain when she woke up yesterday.  She states the pain as a 9/10.  That hurts when she moves it hurts when she is touched in the area between the scapula and the spine on the right.  She also complains of some chest discomfort when she takes a deep breath.  She states the last time she had an upper respiratory illness was about a month ago.  She has no current cough.  She reports that she was recently worked up by Dr. Diya Manning cardiology. Pt. with PMedHx : CAD, BANDAR, syncope, epilepsy, hypoglycemia, RLS and asthma.  The patient had a PET myocardial perfusion imaging with pharmacologic stress protocol that showed normal perfusion with no defects low risk for future cardiovascular events.  She also had an echocardiogram that was also unremarkable.       Review of patient's allergies indicates:   Allergen Reactions    Codeine Other (See Comments)     Delusional, felt like she was going to die       Diazepam Other (See Comments)     Delusions, felt like she was going to die; agitation, dizziness      Dexlansoprazole Nausea And Vomiting     N/V/D, confusion    Ketamine Hallucinations    Oxycodone-acetaminophen Nausea And Vomiting     Dizziness       Past Medical History:   Diagnosis Date    Asthma     Diverticulosis     BANDAR (obstructive sleep apnea)     Restless legs     Seizure      Past Surgical History:   Procedure Laterality Date    COLONOSCOPY  05/20/2013     Family History   Problem Relation Name Age of Onset    Alzheimer's disease Mother      Dementia Father      Asthma Brother       Social History     Tobacco Use    Smoking status: Never    Smokeless tobacco: Never    Substance Use Topics    Alcohol use: Not Currently    Drug use: Never     Review of Systems   Constitutional:  Negative for fever.   HENT:  Negative for sore throat.    Respiratory:  Negative for shortness of breath.    Cardiovascular:  Positive for chest pain.   Gastrointestinal:  Negative for nausea.   Genitourinary:  Negative for dysuria.   Musculoskeletal:  Positive for back pain.   Skin:  Negative for rash.   Neurological:  Negative for weakness.   Hematological:  Does not bruise/bleed easily.       Physical Exam     Initial Vitals [11/08/24 0845]   BP Pulse Resp Temp SpO2   (!) 160/79 73 18 97.5 °F (36.4 °C) 99 %      MAP       --         Physical Exam    Nursing note and vitals reviewed.  Constitutional: She appears well-developed and well-nourished.   HENT:   Head: Normocephalic and atraumatic.   Eyes: Conjunctivae and EOM are normal. Pupils are equal, round, and reactive to light.   Neck: Neck supple.   Normal range of motion.  Cardiovascular:  Normal rate, regular rhythm, normal heart sounds and intact distal pulses.           Pulmonary/Chest: Breath sounds normal.   Abdominal: Abdomen is soft. Bowel sounds are normal.   Musculoskeletal:         General: Tenderness (Paraspinal muscles between the left scapula and spine and the supraspinatus) present. Normal range of motion.      Cervical back: Normal range of motion and neck supple.     Neurological: She is alert and oriented to person, place, and time. She has normal strength.   Skin: Skin is warm and dry. Capillary refill takes less than 2 seconds.   Psychiatric: She has a normal mood and affect. Her behavior is normal. Judgment and thought content normal.         ED Course   Procedures  Labs Reviewed - No data to display       Imaging Results    None          Medications   HYDROmorphone (PF) injection 0.5 mg (0.5 mg Intravenous Given 11/8/24 9784)   methylPREDNISolone sodium succinate injection 125 mg (125 mg Intravenous Given 11/8/24 0936)    methocarbamoL injection 1,000 mg (1,000 mg Intravenous Given 11/8/24 7563)     Medical Decision Making  Symptoms are improved after pain medicine and muscle relaxers though she still has some pain.     Risk  Prescription drug management.                                      Clinical Impression:  Final diagnoses:  [M54.6] Acute left-sided thoracic back pain (Primary)          ED Disposition Condition    Discharge Stable          ED Prescriptions       Medication Sig Dispense Start Date End Date Auth. Provider    HYDROcodone-acetaminophen (NORCO) 5-325 mg per tablet Take 1 tablet by mouth every 6 (six) hours as needed for Pain. 12 tablet 11/8/2024 -- Zaid Nunn MD    tiZANidine (ZANAFLEX) 4 MG tablet Take 1 tablet (4 mg total) by mouth every 8 (eight) hours. for 10 days 30 tablet 11/8/2024 11/18/2024 Zaid Nunn MD          Follow-up Information       Follow up With Specialties Details Why Contact Info    Sulema Boss Jr., MD Family Medicine Schedule an appointment as soon as possible for a visit   [address] [facility] [id number] [phone]               Zaid Nunn MD  11/08/24 7343

## 2024-11-08 NOTE — ED NOTES
Per Dr. Nunn request called spoke with poppy SOARES will fax test results patient's  signed medical release information

## 2024-11-08 NOTE — ED NOTES
Patient's  will drive her home instructed patient no alcohol or driving while on prescription medications

## 2025-01-07 ENCOUNTER — OFFICE VISIT (OUTPATIENT)
Dept: NEUROLOGY | Facility: CLINIC | Age: 80
End: 2025-01-07
Payer: MEDICARE

## 2025-01-07 VITALS
BODY MASS INDEX: 23.39 KG/M2 | HEIGHT: 64 IN | DIASTOLIC BLOOD PRESSURE: 82 MMHG | SYSTOLIC BLOOD PRESSURE: 138 MMHG | WEIGHT: 137 LBS

## 2025-01-07 DIAGNOSIS — R56.9 SEIZURE: ICD-10-CM

## 2025-01-07 DIAGNOSIS — R42 VERTIGO: ICD-10-CM

## 2025-01-07 DIAGNOSIS — R42 DIZZINESS AND GIDDINESS: ICD-10-CM

## 2025-01-07 DIAGNOSIS — G40.109 LOCALIZATION-RELATED EPILEPSY: Primary | ICD-10-CM

## 2025-01-07 PROCEDURE — 99999 PR PBB SHADOW E&M-EST. PATIENT-LVL III: CPT | Mod: PBBFAC,,, | Performed by: PSYCHIATRY & NEUROLOGY

## 2025-01-07 PROCEDURE — 99213 OFFICE O/P EST LOW 20 MIN: CPT | Mod: PBBFAC | Performed by: PSYCHIATRY & NEUROLOGY

## 2025-01-07 RX ORDER — LAMOTRIGINE 150 MG/1
150 TABLET ORAL DAILY
Qty: 90 TABLET | Refills: 4 | Status: SHIPPED | OUTPATIENT
Start: 2025-01-07

## 2025-01-07 NOTE — PROGRESS NOTES
Subjective     Patient ID: Tracie Blanc is a 79 y.o. female.    Chief Complaint: Seizures    HPI  No sz  Having a lot of dysequilibrium triggerd by stairs/lines/etc  Weak L eye  Review of Systems  The remainder of the 14 system ROS is noncontributory or negative unless mentioned/reviewed above.       Objective     Physical Exam  Mental Status: Alert and oriented x3. Language is fluent with good comprehension.    Cranial Nerve: Pupils are equal, round, and reactive to light. Visual fields are intact to confrontation. Normal fundi. Ocular movements are intact. Face is symmetric at rest and with activation with intact sensation throughout. Hearing intact to finger rub bilaterally. Muscles of tongue and palate activate symmetrically. No dysarthria. Strength is full in sternocleidomastoid and trapezius bilaterally.    Motor: Muscle bulk and tone are normal. Strength is 5/5 in all four extremities both proximally and distally. Intact fine motor movements bilaterally. There is no pronator drift or satelliting on arm roll.    Sensory: Sensation is intact to light touch, pinprick, vibration, and proprioception throughout. Romberg is negative.    Reflexes: 2+ and symmetric at the biceps, triceps, brachioradialis, patella, and Achilles bilaterally. Plantar response is flexor bilaterally.    Coordination: No dysmetria on finger-nose-finger or heel-knee-shin. Normal rapid alternating movements. Fast finger tapping with normal amplitude and speed.    Gait: Narrow based with normal stride length and good arm swing bilaterally. Able to walk on heels, toes, and in tandem.        L esotropia     Assessment and Plan     1. Localization-related epilepsy    2. Seizure    3. Vertigo    4. Dizziness and giddiness  -     MRI Brain Without Contrast; Future; Expected date: 01/07/2025    Other orders  -     lamoTRIgine (LAMICTAL) 150 MG Tab; Take 1 tablet (150 mg total) by mouth once daily.  Dispense: 90 tablet; Refill: 4        Mri  brain for newly noticed strabismus  Continue lamictal  Defers vestibular rehab right now         Follow up in about 4 weeks (around 2/4/2025).

## 2025-01-13 ENCOUNTER — TELEPHONE (OUTPATIENT)
Dept: NEUROLOGY | Facility: CLINIC | Age: 80
End: 2025-01-13
Payer: MEDICARE

## 2025-02-19 ENCOUNTER — OFFICE VISIT (OUTPATIENT)
Dept: NEUROLOGY | Facility: CLINIC | Age: 80
End: 2025-02-19
Payer: MEDICARE

## 2025-02-19 VITALS
WEIGHT: 137 LBS | SYSTOLIC BLOOD PRESSURE: 130 MMHG | DIASTOLIC BLOOD PRESSURE: 74 MMHG | BODY MASS INDEX: 23.39 KG/M2 | HEIGHT: 64 IN

## 2025-02-19 DIAGNOSIS — R42 DIZZINESS AND GIDDINESS: Primary | ICD-10-CM

## 2025-02-19 PROCEDURE — 99213 OFFICE O/P EST LOW 20 MIN: CPT | Mod: PBBFAC | Performed by: PSYCHIATRY & NEUROLOGY

## 2025-03-24 ENCOUNTER — HOSPITAL ENCOUNTER (OUTPATIENT)
Dept: RADIOLOGY | Facility: HOSPITAL | Age: 80
Discharge: HOME | End: 2025-03-24
Attending: OTOLARYNGOLOGY
Payer: MEDICARE

## 2025-03-24 DIAGNOSIS — Z01.818 OTHER SPECIFIED PRE-OPERATIVE EXAMINATION: ICD-10-CM

## 2025-03-24 DIAGNOSIS — Z01.818 OTHER SPECIFIED PRE-OPERATIVE EXAMINATION: Primary | ICD-10-CM

## 2025-03-24 DIAGNOSIS — Z79.01 LONG TERM (CURRENT) USE OF ANTICOAGULANTS: ICD-10-CM

## 2025-03-24 PROCEDURE — 71046 X-RAY EXAM CHEST 2 VIEWS: CPT | Mod: TC

## 2025-04-01 ENCOUNTER — ANESTHESIA EVENT (OUTPATIENT)
Dept: SURGERY | Facility: HOSPITAL | Age: 80
End: 2025-04-01
Payer: MEDICARE

## 2025-04-01 ENCOUNTER — HOSPITAL ENCOUNTER (OUTPATIENT)
Facility: HOSPITAL | Age: 80
Discharge: HOME | End: 2025-04-01
Attending: OTOLARYNGOLOGY | Admitting: OTOLARYNGOLOGY
Payer: MEDICARE

## 2025-04-01 ENCOUNTER — ANESTHESIA (OUTPATIENT)
Dept: SURGERY | Facility: HOSPITAL | Age: 80
End: 2025-04-01
Payer: MEDICARE

## 2025-04-01 PROCEDURE — 63600175 PHARM REV CODE 636 W HCPCS: Performed by: NURSE ANESTHETIST, CERTIFIED REGISTERED

## 2025-04-01 PROCEDURE — 63600175 PHARM REV CODE 636 W HCPCS: Performed by: ANESTHESIOLOGY

## 2025-04-01 PROCEDURE — 37000009 HC ANESTHESIA EA ADD 15 MINS: Performed by: OTOLARYNGOLOGY

## 2025-04-01 PROCEDURE — 36000706: Performed by: OTOLARYNGOLOGY

## 2025-04-01 PROCEDURE — 25000003 PHARM REV CODE 250: Performed by: OTOLARYNGOLOGY

## 2025-04-01 PROCEDURE — 25000003 PHARM REV CODE 250

## 2025-04-01 PROCEDURE — 63600175 PHARM REV CODE 636 W HCPCS

## 2025-04-01 PROCEDURE — 63600175 PHARM REV CODE 636 W HCPCS: Performed by: OTOLARYNGOLOGY

## 2025-04-01 PROCEDURE — 37000008 HC ANESTHESIA 1ST 15 MINUTES: Performed by: OTOLARYNGOLOGY

## 2025-04-01 PROCEDURE — 36000707: Performed by: OTOLARYNGOLOGY

## 2025-04-01 PROCEDURE — 71000015 HC POSTOP RECOV 1ST HR: Performed by: OTOLARYNGOLOGY

## 2025-04-01 PROCEDURE — 71000033 HC RECOVERY, INTIAL HOUR: Performed by: OTOLARYNGOLOGY

## 2025-04-01 PROCEDURE — 71000016 HC POSTOP RECOV ADDL HR: Performed by: OTOLARYNGOLOGY

## 2025-04-01 RX ORDER — ONDANSETRON HYDROCHLORIDE 2 MG/ML
INJECTION, SOLUTION INTRAVENOUS
Status: DISCONTINUED | OUTPATIENT
Start: 2025-04-01 | End: 2025-04-01

## 2025-04-01 RX ORDER — HYDRALAZINE HYDROCHLORIDE 20 MG/ML
10 INJECTION INTRAMUSCULAR; INTRAVENOUS ONCE
Status: DISCONTINUED | OUTPATIENT
Start: 2025-04-01 | End: 2025-04-01 | Stop reason: HOSPADM

## 2025-04-01 RX ORDER — PROPOFOL 10 MG/ML
INJECTION, EMULSION INTRAVENOUS
Status: DISCONTINUED | OUTPATIENT
Start: 2025-04-01 | End: 2025-04-01

## 2025-04-01 RX ORDER — MIDAZOLAM HYDROCHLORIDE 2 MG/2ML
INJECTION, SOLUTION INTRAMUSCULAR; INTRAVENOUS
Status: COMPLETED
Start: 2025-04-01 | End: 2025-04-01

## 2025-04-01 RX ORDER — EPINEPHRINE NASAL SOLUTION 1 MG/ML
SOLUTION NASAL
Status: DISCONTINUED
Start: 2025-04-01 | End: 2025-04-01 | Stop reason: HOSPADM

## 2025-04-01 RX ORDER — HYDROMORPHONE HYDROCHLORIDE 2 MG/ML
0.2 INJECTION, SOLUTION INTRAMUSCULAR; INTRAVENOUS; SUBCUTANEOUS EVERY 5 MIN PRN
Status: DISCONTINUED | OUTPATIENT
Start: 2025-04-01 | End: 2025-04-01 | Stop reason: HOSPADM

## 2025-04-01 RX ORDER — CHOLECALCIFEROL (VITAMIN D3) 25 MCG
5000 TABLET ORAL
COMMUNITY

## 2025-04-01 RX ORDER — CEFAZOLIN SODIUM 1 G/3ML
2 INJECTION, POWDER, FOR SOLUTION INTRAMUSCULAR; INTRAVENOUS
Status: DISCONTINUED | OUTPATIENT
Start: 2025-04-01 | End: 2025-04-01 | Stop reason: HOSPADM

## 2025-04-01 RX ORDER — LIDOCAINE HYDROCHLORIDE AND EPINEPHRINE 10; 10 UG/ML; MG/ML
INJECTION, SOLUTION INFILTRATION; PERINEURAL
Status: DISCONTINUED
Start: 2025-04-01 | End: 2025-04-01 | Stop reason: HOSPADM

## 2025-04-01 RX ORDER — GLUCAGON 1 MG
1 KIT INJECTION
Status: DISCONTINUED | OUTPATIENT
Start: 2025-04-01 | End: 2025-04-01 | Stop reason: HOSPADM

## 2025-04-01 RX ORDER — HALOPERIDOL LACTATE 5 MG/ML
0.5 INJECTION, SOLUTION INTRAMUSCULAR EVERY 10 MIN PRN
Status: DISCONTINUED | OUTPATIENT
Start: 2025-04-01 | End: 2025-04-01 | Stop reason: HOSPADM

## 2025-04-01 RX ORDER — GLYCOPYRROLATE 0.2 MG/ML
INJECTION INTRAMUSCULAR; INTRAVENOUS
Status: DISCONTINUED | OUTPATIENT
Start: 2025-04-01 | End: 2025-04-01

## 2025-04-01 RX ORDER — EPINEPHRINE NASAL SOLUTION 1 MG/ML
SOLUTION NASAL
Status: DISCONTINUED | OUTPATIENT
Start: 2025-04-01 | End: 2025-04-01 | Stop reason: HOSPADM

## 2025-04-01 RX ORDER — FENTANYL CITRATE 50 UG/ML
INJECTION, SOLUTION INTRAMUSCULAR; INTRAVENOUS
Status: DISCONTINUED | OUTPATIENT
Start: 2025-04-01 | End: 2025-04-01

## 2025-04-01 RX ORDER — DEXAMETHASONE SODIUM PHOSPHATE 4 MG/ML
INJECTION, SOLUTION INTRA-ARTICULAR; INTRALESIONAL; INTRAMUSCULAR; INTRAVENOUS; SOFT TISSUE
Status: DISCONTINUED | OUTPATIENT
Start: 2025-04-01 | End: 2025-04-01

## 2025-04-01 RX ORDER — METHOCARBAMOL 100 MG/ML
1000 INJECTION, SOLUTION INTRAMUSCULAR; INTRAVENOUS ONCE
Status: COMPLETED | OUTPATIENT
Start: 2025-04-01 | End: 2025-04-01

## 2025-04-01 RX ORDER — HYDROCODONE BITARTRATE AND ACETAMINOPHEN 7.5; 325 MG/1; MG/1
1 TABLET ORAL EVERY 6 HOURS PRN
Refills: 0 | Status: DISCONTINUED | OUTPATIENT
Start: 2025-04-01 | End: 2025-04-01 | Stop reason: HOSPADM

## 2025-04-01 RX ORDER — LIDOCAINE HYDROCHLORIDE AND EPINEPHRINE 10; 10 UG/ML; MG/ML
INJECTION, SOLUTION INFILTRATION; PERINEURAL
Status: DISCONTINUED | OUTPATIENT
Start: 2025-04-01 | End: 2025-04-01 | Stop reason: HOSPADM

## 2025-04-01 RX ORDER — BACITRACIN ZINC AND POLYMYXIN B SULFATE 500; 10000 [USP'U]/G; [USP'U]/G
OINTMENT OPHTHALMIC
Status: DISCONTINUED
Start: 2025-04-01 | End: 2025-04-01 | Stop reason: HOSPADM

## 2025-04-01 RX ORDER — LIDOCAINE HYDROCHLORIDE 20 MG/ML
INJECTION INTRAVENOUS
Status: DISCONTINUED | OUTPATIENT
Start: 2025-04-01 | End: 2025-04-01

## 2025-04-01 RX ORDER — KETOROLAC TROMETHAMINE 30 MG/ML
15 INJECTION, SOLUTION INTRAMUSCULAR; INTRAVENOUS EVERY 8 HOURS PRN
Status: DISCONTINUED | OUTPATIENT
Start: 2025-04-01 | End: 2025-04-01 | Stop reason: HOSPADM

## 2025-04-01 RX ORDER — CEFDINIR 300 MG/1
300 CAPSULE ORAL EVERY 12 HOURS
COMMUNITY
Start: 2025-03-24

## 2025-04-01 RX ORDER — MIDAZOLAM HYDROCHLORIDE 2 MG/2ML
1 INJECTION, SOLUTION INTRAMUSCULAR; INTRAVENOUS ONCE
Status: COMPLETED | OUTPATIENT
Start: 2025-04-01 | End: 2025-04-01

## 2025-04-01 RX ORDER — TETRACAINE HYDROCHLORIDE 5 MG/ML
SOLUTION OPHTHALMIC
Status: DISCONTINUED
Start: 2025-04-01 | End: 2025-04-01 | Stop reason: WASHOUT

## 2025-04-01 RX ADMIN — KETOROLAC TROMETHAMINE 15 MG: 30 INJECTION, SOLUTION INTRAMUSCULAR; INTRAVENOUS at 10:04

## 2025-04-01 RX ADMIN — MIDAZOLAM HYDROCHLORIDE 1 MG: 1 INJECTION, SOLUTION INTRAMUSCULAR; INTRAVENOUS at 09:04

## 2025-04-01 RX ADMIN — GLYCOPYRROLATE 0.1 MG: 0.2 INJECTION INTRAMUSCULAR; INTRAVENOUS at 09:04

## 2025-04-01 RX ADMIN — DEXAMETHASONE SODIUM PHOSPHATE 4 MG: 4 INJECTION, SOLUTION INTRA-ARTICULAR; INTRALESIONAL; INTRAMUSCULAR; INTRAVENOUS; SOFT TISSUE at 09:04

## 2025-04-01 RX ADMIN — FENTANYL CITRATE 50 MCG: 50 INJECTION, SOLUTION INTRAMUSCULAR; INTRAVENOUS at 09:04

## 2025-04-01 RX ADMIN — MIDAZOLAM HYDROCHLORIDE 1 MG: 2 INJECTION, SOLUTION INTRAMUSCULAR; INTRAVENOUS at 09:04

## 2025-04-01 RX ADMIN — PROPOFOL 120 MG: 10 INJECTION, EMULSION INTRAVENOUS at 09:04

## 2025-04-01 RX ADMIN — LIDOCAINE HYDROCHLORIDE 50 MG: 20 INJECTION INTRAVENOUS at 09:04

## 2025-04-01 RX ADMIN — METHOCARBAMOL 1000 MG: 100 INJECTION INTRAMUSCULAR; INTRAVENOUS at 10:04

## 2025-04-01 RX ADMIN — ONDANSETRON 4 MG: 2 INJECTION INTRAMUSCULAR; INTRAVENOUS at 09:04

## 2025-04-01 NOTE — ANESTHESIA PROCEDURE NOTES
Intubation    Date/Time: 4/1/2025 9:11 AM    Performed by: Vashti Weathers CRNA  Authorized by: Jake Strauss MD    Intubation:     Induction:  Intravenous    Intubated:  Postinduction    Mask Ventilation:  Easy mask    Attempts:  1    Attempted By:  CRNA    Method of Intubation:  Blind intubation    Difficult Airway Encountered?: No      Complications:  None    Airway Device:  Supraglottic airway/LMA    Airway Device Size:  3.0    Style/Cuff Inflation:  Cuffed (inflated to minimal occlusive pressure)    Secured at:  The teeth    Placement Verified By:  Capnometry    Complicating Factors:  None    Findings Post-Intubation:  BS equal bilateral

## 2025-04-01 NOTE — PLAN OF CARE
Patient resting comfortably, VSS, no signs or symptoms of distress.  Henry at acceptable level for transfer to phase II.  Criteria met for anesthesia, released per Dr. Strauss.

## 2025-04-01 NOTE — ANESTHESIA POSTPROCEDURE EVALUATION
Anesthesia Post Evaluation    Patient: Tracie Blanc    Procedure(s) Performed: Procedure(s) (LRB):  REPAIR, ECTROPION, EYELID / Bilateral lower lid (Bilateral)  CANTHOPLASTY / Left (Bilateral)    Final Anesthesia Type: general      Patient location during evaluation: PACU  Patient participation: Yes- Able to Participate  Level of consciousness: awake and alert and oriented  Post-procedure vital signs: reviewed and stable  Pain management: adequate  Airway patency: patent  BANDAR mitigation strategies: Postoperative administration of CPAP, nasopharyngeal airway, or oral appliance in the postanesthesia care unit (PACU)  PONV status at discharge: No PONV  Anesthetic complications: no      Cardiovascular status: hemodynamically stable  Respiratory status: unassisted, spontaneous ventilation and room air  Hydration status: euvolemic  Follow-up not needed.              Vitals Value Taken Time   /79 04/01/25 11:39   Temp 35.8 °C (96.4 °F) 04/01/25 10:40   Pulse 60 04/01/25 11:39   Resp 18 04/01/25 10:40   SpO2 97 % 04/01/25 11:39         Event Time   Out of Recovery 10:36:30         Pain/Henry Score: Pain Rating Prior to Med Admin: 3 (4/1/2025 10:27 AM)  Henry Score: 10 (4/1/2025 11:40 AM)

## 2025-04-01 NOTE — DISCHARGE INSTRUCTIONS
Patient Education        Blepharoplasty Discharge Instructions      What happens after the procedure?   The staff will watch you closely until you are ready to go home.  You may have ice bandages over your eyes when you wake up from the surgery. Talk to your doctor about how to care for your wounds.  You may feel ointment on your eyes incisions. This will help with dryness.    What care is needed at home?   Wash your hands with soap and water, clean eyelid using a q-tip with warm water, then apply ointment to eyelid. Do this twice a day as instructed by your doctor. Do not use peroxide!  Your eyelids may feel tight and sore. Ice packs will help with swelling and bruising. This may last for a couple of weeks. Scars will most likely fade after a few months. Place an ice pack wrapped in a towel over the painful part. Never put ice right on the skin. Do not leave the ice on more than 10 to 15 minutes at a time.  You may shower or bathe but do not soak or saturate eyelid with water.  Wear sunglasses over your eyelids. Your eyes may be sensitive to sunlight, wind, and other irritants.  Prop your head on pillows.   You may use over the counter pain medications.    What follow-up care is needed?   Be sure to keep your follow up visit.     What lifestyle changes are needed?   Ask your doctor when you can wear your contact lenses or make up again.  Do not rub your eyes after the surgery.    Will physical activity be limited?   Activity as tolerated    What problems could happen?   Bleeding. Some bruising or a black eye is normal and this will improve after a few days.  Facial pain  Redness, swelling, or more pain in the eyelids  Infection  Overcorrection, and eyelids do not close completely  Under correction, and eyelids are still droopy  Double vision  Eye dryness    When do I need to call the doctor?   Signs of infection. These include a fever of 100.4°F (38°C) or higher, chills, wound that will not heal.  Signs of  wound infection. These include swelling, redness, warmth around the wound; too much pain when touched; yellowish, greenish, or bloody discharge; foul smell coming from the cut site; cut site opens up.   Uncontrolled pain.

## 2025-04-01 NOTE — ANESTHESIA PREPROCEDURE EVALUATION
"                                                                                                             04/01/2025  Tracie Blanc is a 79 y.o., female here for ectropion repair of both eyes.     Height: 5' 4" (1.626 m) (03/31/25)   Weight: 63.1 kg (139 lb 1.8 oz) (04/01/25)   BMI: 23.88   NPO Status: 1900   Allergies: CODEINE, DIAZEPAM, DEXLANSOPRAZOLE, KETAMINE, OXYCODONE-ACETAMINOPHEN     Pre Vitals  Current as of 04/01/25 0823  BP: Pulse:   Resp: 18 SpO2:   Temp:     Past Medical History   Seizure Diverticulosis   Asthma Restless legs   BANDAR (obstructive sleep apnea)      Surgical History    COLONOSCOPY BUNIONECTOMY   ANKLE SURGERY HIP SURGERY   TUBAL LIGATION HERNIA REPAIR   LUMBAR SPINE SURGERY THROAT SURGERY   CATARACT EXTRACTION CARPAL TUNNEL RELEASE   SHOULDER SURGERY      Prescription Medications  Within last 14 days from 04/01/25   Last Taken Last Updated   cefdinir (OMNICEF) 300 MG capsule    3/31/2025 04/01/25 0749   HYDROcodone-acetaminophen (NORCO) 5-325 mg per tablet    Unknown 04/01/25 0746   Lactobacillus rhamnosus GG (CULTURELLE) 10 billion cell capsule    Past Week 04/01/25 0749   lamoTRIgine (LAMICTAL) 150 MG Tab    3/31/2025 04/01/25 0746   melatonin 10 mg Tab    Past Week 04/01/25 0746   montelukast (SINGULAIR) 10 mg tablet    3/31/2025 04/01/25 0746   traZODone (DESYREL) 100 MG tablet    Past Month 04/01/25 0746   vitamin D (VITAMIN D3) 1000 units Tab    Past Week 04/01/25 0749      Latest Reference Range & Units 03/24/25 12:02   WBC 4.50 - 11.50 x10(3)/mcL 4.51   RBC 4.20 - 5.40 x10(6)/mcL 4.26   Hemoglobin 12.0 - 16.0 g/dL 13.4   Hematocrit 37.0 - 47.0 % 40.5   Platelet Count 130 - 400 x10(3)/mcL 182   PT 12.5 - 14.5 seconds 12.5   INR <=1.3  0.9   PTT 23.2 - 33.7 seconds 30.9   Sodium 136 - 145 mmol/L 140   Potassium 3.5 - 5.1 mmol/L 4.3   Chloride 98 - 107 mmol/L 107   CO2 23 - 31 mmol/L 27   Anion Gap mEq/L 6.0   BUN 9.8 - 20.1 mg/dL 22.7 (H)   Creatinine 0.55 - 1.02 mg/dL 0.81 "   BUN/CREAT RATIO  28   eGFR mL/min/1.73/m2 >60   Glucose 82 - 115 mg/dL 90   Calcium 8.4 - 10.2 mg/dL 9.4   EKG 3/24/25   Normal sinus rhythm   Left axis deviation   Nonspecific intraventricular block   Cannot rule out Septal infarct ,age undetermined     Pre-op Assessment    I have reviewed the Patient Summary Reports.    I have reviewed the NPO Status.   I have reviewed the Medications.     Review of Systems  Anesthesia Hx:   History of prior surgery of interest to airway management or planning:  Previous anesthesia: General        Denies Family Hx of Anesthesia complications.    Denies Personal Hx of Anesthesia complications.                    Social:  Non-Smoker, No Alcohol Use       Cardiovascular:  Exercise tolerance: good                 ECG has been reviewed.                            Pulmonary:    Asthma asymptomatic and mild   Sleep Apnea, CPAP   Asthma:    Obstructive Sleep Apnea (BANDAR).      Education provided regarding risk of obstructive sleep apnea            Neurological:       Seizures, well controlled           Seizure Disorder                          Endocrine:   Hypothyroidism       Hypothyroidism              Physical Exam  General: Well nourished, Cooperative, Alert and Oriented    Airway:  Mallampati: I   Mouth Opening: Normal  TM Distance: Normal  Tongue: Normal  Neck ROM: Normal ROM    Dental:  Intact    Chest/Lungs:  Clear to auscultation, Normal Respiratory Rate    Heart:  Rate: Normal  Rhythm: Regular Rhythm  Sounds: Normal    Abdomen:  Normal, Soft, Nontender        Anesthesia Plan  Type of Anesthesia, risks & benefits discussed:    Anesthesia Type: Gen Supraglottic Airway  Intra-op Monitoring Plan: Standard ASA Monitors  Post Op Pain Control Plan: multimodal analgesia  Induction:  IV  Airway Plan: Direct, Post-Induction  Informed Consent: Informed consent signed with the Patient and all parties understand the risks and agree with anesthesia plan.  All questions answered. Patient  consented to blood products? No  ASA Score: 3  Day of Surgery Review of History & Physical: H&P Update referred to the surgeon/provider.    Ready For Surgery From Anesthesia Perspective.     .

## 2025-04-01 NOTE — TRANSFER OF CARE
"Anesthesia Transfer of Care Note    Patient: Tracie Blanc    Procedure(s) Performed: Procedure(s) (LRB):  REPAIR, ECTROPION, EYELID / Bilateral lower lid (Bilateral)  CANTHOPLASTY / Left (Bilateral)    Patient location: PACU    Anesthesia Type: general    Transport from OR: Transported from OR on room air with adequate spontaneous ventilation    Post pain: adequate analgesia    Post assessment: no apparent anesthetic complications and tolerated procedure well    Post vital signs: stable    Level of consciousness: responds to stimulation    Nausea/Vomiting: no nausea/vomiting    Complications: none    Transfer of care protocol was followed      Last vitals: Visit Vitals  BP (!) 144/68   Pulse 86   Temp 36.7 °C (98.1 °F) (Oral)   Resp 18   Ht 5' 4" (1.626 m)   Wt 63.1 kg (139 lb 1.8 oz)   SpO2 97%   Breastfeeding No   BMI 23.88 kg/m²     "

## 2025-04-02 VITALS
DIASTOLIC BLOOD PRESSURE: 79 MMHG | RESPIRATION RATE: 18 BRPM | BODY MASS INDEX: 23.75 KG/M2 | TEMPERATURE: 96 F | SYSTOLIC BLOOD PRESSURE: 153 MMHG | OXYGEN SATURATION: 97 % | WEIGHT: 139.13 LBS | HEART RATE: 60 BPM | HEIGHT: 64 IN

## 2025-04-30 NOTE — OP NOTE
[facility] [address] [place] [address]    PATIENT NAME:      DOROTHY BEAULIEU  YOB: 1945  CSN:               [id number]  MRN:               [id number]  ADMIT DATE:        04/01/2025 07:11:00  PHYSICIAN:         Phillip Paz MD                          OPERATIVE REPORT      DATE OF SURGERY:    04/01/2025 00:00:00    SURGEON:  Phillip Paz MD    PREOPERATIVE DIAGNOSIS:  Bilateral ectropion lower eyelid.    PROCEDURE PERFORMED:  Bilateral ectropion repair with lateral canthoplasty.    ANESTHESIA:  General.    BLOOD LOSS:  Minimal.    PROCEDURE IN DETAIL:  The patient was brought to the operative suite, placed in   supine position.  Anesthesia administered general anesthesia with endotracheal   intubation.  She was prepped and draped in sterile fashion.  The patient's lower   eyelids with some significant scleral show secondary to ectropion starting on   the patient's right side.  A lateral cantholysis was performed releasing the   lower eyelid.  The dissection continued with the Bovie cautery down removing a   wedge of tissue and the  aspect of the tarsal plates was denuded and secured,   reforming the patient's lateral canthus.  This was secured with 5-0 Vicryl   suture as well as 5-0 Vicryl suture repairing the orbital septum and then a 5-0   fast absorbing plain gut suture was used to repair the patient's skin.  This was   done in the same fashion on the patient's contralateral side.  The patient was   then turned over to Anesthesia for extubation.  She tolerated the procedure well   without complications.    Patient is status post ectropion repair. Procedure was tolerated well. Patient is discharged home and will follow up in office.       ______________________________  Phillip Paz MD    RPC/AQS  DD:  04/29/2025  Time:  12:26PM  DT:  04/29/2025  Time:  07:15PM  Job  #:  [id number]      OPERATIVE REPORT

## 2025-08-13 ENCOUNTER — HOSPITAL ENCOUNTER (EMERGENCY)
Facility: HOSPITAL | Age: 80
Discharge: HOME | End: 2025-08-13
Attending: SPECIALIST
Payer: MEDICARE

## 2025-08-13 VITALS
RESPIRATION RATE: 18 BRPM | SYSTOLIC BLOOD PRESSURE: 147 MMHG | HEIGHT: 64 IN | WEIGHT: 140 LBS | OXYGEN SATURATION: 99 % | BODY MASS INDEX: 23.9 KG/M2 | HEART RATE: 56 BPM | TEMPERATURE: 98 F | DIASTOLIC BLOOD PRESSURE: 75 MMHG

## 2025-08-13 DIAGNOSIS — R07.9 CHEST PAIN: ICD-10-CM

## 2025-08-13 DIAGNOSIS — I10 PRIMARY HYPERTENSION: ICD-10-CM

## 2025-08-13 DIAGNOSIS — R07.89 ATYPICAL CHEST PAIN: Primary | ICD-10-CM

## 2025-08-13 LAB
ALBUMIN SERPL-MCNC: 3.6 G/DL (ref 3.4–4.8)
ALBUMIN/GLOB SERPL: 1.2 RATIO (ref 1.1–2)
ALP SERPL-CCNC: 75 UNIT/L (ref 40–150)
ALT SERPL-CCNC: 16 UNIT/L (ref 0–55)
ANION GAP SERPL CALC-SCNC: 7 MEQ/L
AST SERPL-CCNC: 19 UNIT/L (ref 11–45)
BASOPHILS # BLD AUTO: 0.03 X10(3)/MCL
BASOPHILS NFR BLD AUTO: 0.7 %
BILIRUB SERPL-MCNC: 0.7 MG/DL
BUN SERPL-MCNC: 18.8 MG/DL (ref 9.8–20.1)
CALCIUM SERPL-MCNC: 9.2 MG/DL (ref 8.4–10.2)
CHLORIDE SERPL-SCNC: 110 MMOL/L (ref 98–107)
CO2 SERPL-SCNC: 26 MMOL/L (ref 23–31)
CREAT SERPL-MCNC: 0.9 MG/DL (ref 0.55–1.02)
CREAT/UREA NIT SERPL: 21
EOSINOPHIL # BLD AUTO: 0.2 X10(3)/MCL (ref 0–0.9)
EOSINOPHIL NFR BLD AUTO: 4.4 %
ERYTHROCYTE [DISTWIDTH] IN BLOOD BY AUTOMATED COUNT: 12.1 % (ref 11.5–17)
GFR SERPLBLD CREATININE-BSD FMLA CKD-EPI: >60 ML/MIN/1.73/M2
GLOBULIN SER-MCNC: 3 GM/DL (ref 2.4–3.5)
GLUCOSE SERPL-MCNC: 90 MG/DL (ref 82–115)
HCT VFR BLD AUTO: 38.5 % (ref 37–47)
HGB BLD-MCNC: 12.8 G/DL (ref 12–16)
IMM GRANULOCYTES # BLD AUTO: 0 X10(3)/MCL (ref 0–0.04)
IMM GRANULOCYTES NFR BLD AUTO: 0 %
LIPASE SERPL-CCNC: 18 U/L
LYMPHOCYTES # BLD AUTO: 1.28 X10(3)/MCL (ref 0.6–4.6)
LYMPHOCYTES NFR BLD AUTO: 28.3 %
MCH RBC QN AUTO: 31.5 PG (ref 27–31)
MCHC RBC AUTO-ENTMCNC: 33.2 G/DL (ref 33–36)
MCV RBC AUTO: 94.8 FL (ref 80–94)
MONOCYTES # BLD AUTO: 0.38 X10(3)/MCL (ref 0.1–1.3)
MONOCYTES NFR BLD AUTO: 8.4 %
NEUTROPHILS # BLD AUTO: 2.64 X10(3)/MCL (ref 2.1–9.2)
NEUTROPHILS NFR BLD AUTO: 58.2 %
PLATELET # BLD AUTO: 182 X10(3)/MCL (ref 130–400)
PMV BLD AUTO: 10 FL (ref 7.4–10.4)
POTASSIUM SERPL-SCNC: 3.5 MMOL/L (ref 3.5–5.1)
PROT SERPL-MCNC: 6.6 GM/DL (ref 5.8–7.6)
RBC # BLD AUTO: 4.06 X10(6)/MCL (ref 4.2–5.4)
SODIUM SERPL-SCNC: 143 MMOL/L (ref 136–145)
TROPONIN I SERPL HS-MCNC: <3 NG/L
TROPONIN I SERPL HS-MCNC: <3 NG/L
WBC # BLD AUTO: 4.53 X10(3)/MCL (ref 4.5–11.5)

## 2025-08-13 PROCEDURE — 85025 COMPLETE CBC W/AUTO DIFF WBC: CPT | Performed by: SPECIALIST

## 2025-08-13 PROCEDURE — 93010 ELECTROCARDIOGRAM REPORT: CPT | Mod: ,,, | Performed by: INTERNAL MEDICINE

## 2025-08-13 PROCEDURE — 99285 EMERGENCY DEPT VISIT HI MDM: CPT | Mod: 25

## 2025-08-13 PROCEDURE — 93005 ELECTROCARDIOGRAM TRACING: CPT

## 2025-08-13 PROCEDURE — 25000003 PHARM REV CODE 250: Performed by: FAMILY MEDICINE

## 2025-08-13 PROCEDURE — 83690 ASSAY OF LIPASE: CPT | Performed by: SPECIALIST

## 2025-08-13 PROCEDURE — 80053 COMPREHEN METABOLIC PANEL: CPT | Performed by: SPECIALIST

## 2025-08-13 PROCEDURE — 84484 ASSAY OF TROPONIN QUANT: CPT | Performed by: FAMILY MEDICINE

## 2025-08-13 PROCEDURE — 84484 ASSAY OF TROPONIN QUANT: CPT | Performed by: SPECIALIST

## 2025-08-13 RX ORDER — FAMOTIDINE 20 MG/1
20 TABLET, FILM COATED ORAL
Status: COMPLETED | OUTPATIENT
Start: 2025-08-13 | End: 2025-08-13

## 2025-08-13 RX ORDER — AMLODIPINE BESYLATE 5 MG/1
10 TABLET ORAL DAILY
Qty: 30 TABLET | Refills: 0 | Status: SHIPPED | OUTPATIENT
Start: 2025-08-13 | End: 2026-08-13

## 2025-08-13 RX ADMIN — FAMOTIDINE 20 MG: 20 TABLET, FILM COATED ORAL at 07:08

## 2025-08-14 LAB
OHS QRS DURATION: 140 MS
OHS QTC CALCULATION: 494 MS

## (undated) DEVICE — SUPPORT ULNA NERVE PROTECTOR

## (undated) DEVICE — GLOVE SIGNATURE MICRO LTX 6.5

## (undated) DEVICE — BLADE SURG STAINLESS STEEL #11

## (undated) DEVICE — GLOVE SIGNATURE MICRO LTX 7

## (undated) DEVICE — PENCIL ELECSURG ROCKER 15FT

## (undated) DEVICE — ELECTRODE NEEDLE 2.8IN

## (undated) DEVICE — NDL N SERIES MICRO-DISSECTION

## (undated) DEVICE — GLOVE SENSICARE PI GRN 7

## (undated) DEVICE — PROTECTOR CORNEAL CROUCH ST

## (undated) DEVICE — ELECTRODE PATIENT RETURN DISP

## (undated) DEVICE — SOL NACL IRR 1000ML BTL

## (undated) DEVICE — SUT 5/0 18IN PDS II CLR MO

## (undated) DEVICE — COVER HANDLE LIGHT RIGID

## (undated) DEVICE — SUT SILK 6-0 P-1 18IN

## (undated) DEVICE — DISCONTINUED SEE L# 4355

## (undated) DEVICE — SUT 2/0 30IN SILK BLK BRAI

## (undated) DEVICE — Device